# Patient Record
Sex: FEMALE | Race: WHITE | NOT HISPANIC OR LATINO | Employment: OTHER | URBAN - NONMETROPOLITAN AREA
[De-identification: names, ages, dates, MRNs, and addresses within clinical notes are randomized per-mention and may not be internally consistent; named-entity substitution may affect disease eponyms.]

---

## 2017-01-30 ENCOUNTER — HOSPITAL ENCOUNTER (EMERGENCY)
Facility: HOSPITAL | Age: 32
Discharge: HOME | End: 2017-01-30
Attending: EMERGENCY MEDICINE | Admitting: EMERGENCY MEDICINE

## 2017-01-30 ENCOUNTER — APPOINTMENT (OUTPATIENT)
Dept: GENERAL RADIOLOGY | Facility: HOSPITAL | Age: 32
End: 2017-01-30

## 2017-01-30 VITALS
RESPIRATION RATE: 20 BRPM | OXYGEN SATURATION: 97 % | BODY MASS INDEX: 22.08 KG/M2 | WEIGHT: 120 LBS | SYSTOLIC BLOOD PRESSURE: 110 MMHG | DIASTOLIC BLOOD PRESSURE: 70 MMHG | HEIGHT: 62 IN | HEART RATE: 78 BPM | TEMPERATURE: 98.4 F

## 2017-01-30 DIAGNOSIS — R07.89 CHEST WALL PAIN: Primary | ICD-10-CM

## 2017-01-30 PROCEDURE — 99283 EMERGENCY DEPT VISIT LOW MDM: CPT

## 2017-01-30 PROCEDURE — 71020 HC CHEST PA AND LATERAL: CPT

## 2017-01-30 PROCEDURE — 93005 ELECTROCARDIOGRAM TRACING: CPT | Performed by: PHYSICIAN ASSISTANT

## 2017-01-30 RX ORDER — NAPROXEN 500 MG/1
500 TABLET ORAL ONCE
Status: COMPLETED | OUTPATIENT
Start: 2017-01-30 | End: 2017-01-30

## 2017-01-30 RX ORDER — NAPROXEN 500 MG/1
500 TABLET ORAL 2 TIMES DAILY PRN
Qty: 20 TABLET | Refills: 0 | Status: SHIPPED | OUTPATIENT
Start: 2017-01-30 | End: 2017-06-21

## 2017-01-30 RX ADMIN — NAPROXEN 500 MG: 500 TABLET ORAL at 16:13

## 2017-06-08 VITALS
SYSTOLIC BLOOD PRESSURE: 96 MMHG | DIASTOLIC BLOOD PRESSURE: 60 MMHG | BODY MASS INDEX: 20.8 KG/M2 | HEIGHT: 62 IN | WEIGHT: 113 LBS

## 2017-06-21 ENCOUNTER — OFFICE VISIT (OUTPATIENT)
Dept: OBSTETRICS AND GYNECOLOGY | Facility: CLINIC | Age: 32
End: 2017-06-21

## 2017-06-21 VITALS
SYSTOLIC BLOOD PRESSURE: 102 MMHG | HEIGHT: 62 IN | DIASTOLIC BLOOD PRESSURE: 60 MMHG | BODY MASS INDEX: 21.71 KG/M2 | WEIGHT: 118 LBS

## 2017-06-21 DIAGNOSIS — N94.10 DYSPAREUNIA, FEMALE: ICD-10-CM

## 2017-06-21 DIAGNOSIS — R10.2 PELVIC PAIN: Primary | ICD-10-CM

## 2017-06-21 PROCEDURE — 99214 OFFICE O/P EST MOD 30 MIN: CPT | Performed by: OBSTETRICS & GYNECOLOGY

## 2017-06-21 RX ORDER — GABAPENTIN 300 MG/1
300 CAPSULE ORAL 3 TIMES DAILY
COMMUNITY
End: 2023-03-02

## 2017-06-21 NOTE — PROGRESS NOTES
"Subjective   Chief Complaint   Patient presents with   • Pelvic Pain     pt has been c/o of pelvic pain x1 year, Hx of Ovarian Cyst     Lacey Hubbard is a 31 y.o. year old .  Patient's last menstrual period was 2017.  She presents to be seen because of LLq pains, severe dysparnueai. Started about one year.  S/P RSO and BTL 2 years ago for similar complaints on the right side. Her mense sare irergular, fluctuating.   Op report reveiwed from 2 years ago no endo or adhesions noted  States U/S today caused her tremendous pain  Regular bowel habits  Last 2/15  Gabapetnin--may be 2 x month  Good health otherwise    OTHER COMPLAINTS:  Nothing else    The following portions of the patient's history were reviewed and updated as appropriate:current medications, allergies, past family history, past medical history, past social history and past surgical history    Smoking status: Current Every Day Smoker                                                   Packs/day: 2.00      Years: 15.00     Smokeless status: Never Used                        Review of Systems  Consitutional POS: nothing reported    NEG: anorexia or night sweats   Gastointestinal POS: abdominal pain    NEG: bloating, change in bowel habits, constipation, diarrhea, melena, nausea, reflux symptoms or vomiting   Genitourinary POS: nothing reported    NEG: dysuria, hematuria, nocturia or urgency   Integument POS: nothing reported    NEG: moles that are changing in size, shape, color or rashes   Breast POS: nothing reported    NEG: persistent breast lump, skin dimpling or nipple discharge         Pertinent items are noted in HPI.          Objective   /60  Ht 62\" (157.5 cm)  Wt 118 lb (53.5 kg)  LMP 2017  BMI 21.58 kg/m2    General:  well developed; well nourished  no acute distress   Skin:  No suspicious lesions seen   Thyroid: normal to inspection and palpation   Lungs:  breathing is unlabored  clear to auscultation bilaterally "   Heart:  regular rate and rhythm, S1, S2 normal, no murmur, click, rub or gallop   Breasts:  Not performed.   Abdomen: soft, non-tender; no masses  no umbilical or inginual hernias are present  no hepato-splenomegaly  Abnormal findings: moderate tenderness in the LLQ   Pelvis: Clinical staff was present for exam  External genitalia:  normal appearance of the external genitalia including Bartholin's and [place]'s glands.  :  urethral meatus normal; urethral hypermobility is absent.  Vaginal:  normal pink mucosa without prolapse or lesions.  Cervix:  normal appearance.  Uterus:  normal size, shape and consistency.  Adnexa:  normal bimanual exam of the adnexa.     Lab Review   No data reviewed    Imaging   Pelvic ultrasound images independantly reviewed - TVs WNL, 1.4 collpasing cyst        Assessment   1. CPP  2. Dysparenuia   Discussed optoins at length with patient as she is moving will start on Low dose OCP     Plan   1. Sample of Loloestrin given, patient moving to [place] in 2 weeks  2. Follow up PRN          This note was electronically signed.      June 21, 2017

## 2017-06-21 NOTE — PATIENT INSTRUCTIONS
Pelvic Pain, Female  Female pelvic pain can be caused by many different things and start from a variety of places. Pelvic pain refers to pain that is located in the lower half of the abdomen and between your hips. The pain may occur over a short period of time (acute) or may be reoccurring (chronic). The cause of pelvic pain may be related to disorders affecting the female reproductive organs (gynecologic), but it may also be related to the bladder, kidney stones, an intestinal complication, or muscle or skeletal problems. Getting help right away for pelvic pain is important, especially if there has been severe, sharp, or a sudden onset of unusual pain. It is also important to get help right away because some types of pelvic pain can be life threatening.   CAUSES   Below are only some of the causes of pelvic pain. The causes of pelvic pain can be in one of several categories.   · Gynecologic.    Pelvic inflammatory disease.    Sexually transmitted infection.    Ovarian cyst or a twisted ovarian ligament (ovarian torsion).    Uterine lining that grows outside the uterus (endometriosis).    Fibroids, cysts, or tumors.    Ovulation.  · Pregnancy.    Pregnancy that occurs outside the uterus (ectopic pregnancy).    Miscarriage.    Labor.    Abruption of the placenta or ruptured uterus.  · Infection.    Uterine infection (endometritis).    Bladder infection.    Diverticulitis.    Miscarriage related to a uterine infection (septic ).  · Bladder.    Inflammation of the bladder (cystitis).    Kidney stone(s).  · Gastrointestinal.    Constipation.    Diverticulitis.  · Neurologic.    Trauma.    Feeling pelvic pain because of mental or emotional causes (psychosomatic).  · Cancers of the bowel or pelvis.  EVALUATION   Your caregiver will want to take a careful history of your concerns. This includes recent changes in your health, a careful gynecologic history of your periods (menses), and a sexual history. Obtaining  your family history and medical history is also important. Your caregiver may suggest a pelvic exam. A pelvic exam will help identify the location and severity of the pain. It also helps in the evaluation of which organ system may be involved. In order to identify the cause of the pelvic pain and be properly treated, your caregiver may order tests. These tests may include:   · A pregnancy test.  · Pelvic ultrasonography.  · An X-ray exam of the abdomen.  · A urinalysis or evaluation of vaginal discharge.  · Blood tests.  HOME CARE INSTRUCTIONS   · Only take over-the-counter or prescription medicines for pain, discomfort, or fever as directed by your caregiver.    · Rest as directed by your caregiver.    · Eat a balanced diet.    · Drink enough fluids to make your urine clear or pale yellow, or as directed.    · Avoid sexual intercourse if it causes pain.    · Apply warm or cold compresses to the lower abdomen depending on which one helps the pain.    · Avoid stressful situations.    · Keep a journal of your pelvic pain. Write down when it started, where the pain is located, and if there are things that seem to be associated with the pain, such as food or your menstrual cycle.  · Follow up with your caregiver as directed.    SEEK MEDICAL CARE IF:  · Your medicine does not help your pain.  · You have abnormal vaginal discharge.  SEEK IMMEDIATE MEDICAL CARE IF:   · You have heavy bleeding from the vagina.    · Your pelvic pain increases.    · You feel light-headed or faint.    · You have chills.    · You have pain with urination or blood in your urine.    · You have uncontrolled diarrhea or vomiting.    · You have a fever or persistent symptoms for more than 3 days.  · You have a fever and your symptoms suddenly get worse.    · You are being physically or sexually abused.     This information is not intended to replace advice given to you by your health care provider. Make sure you discuss any questions you have with  your health care provider.     Document Released: 11/14/2005 Document Revised: 09/07/2016 Document Reviewed: 10/07/2016  Elsevier Interactive Patient Education ©2017 [employer].

## 2017-08-03 ENCOUNTER — OFFICE VISIT (OUTPATIENT)
Dept: OBSTETRICS AND GYNECOLOGY | Facility: CLINIC | Age: 32
End: 2017-08-03

## 2017-08-03 VITALS
DIASTOLIC BLOOD PRESSURE: 66 MMHG | WEIGHT: 123 LBS | BODY MASS INDEX: 22.63 KG/M2 | HEIGHT: 62 IN | SYSTOLIC BLOOD PRESSURE: 108 MMHG

## 2017-08-03 DIAGNOSIS — N92.1 MENORRHAGIA WITH IRREGULAR CYCLE: ICD-10-CM

## 2017-08-03 DIAGNOSIS — R10.2 PELVIC PAIN: Primary | ICD-10-CM

## 2017-08-03 PROCEDURE — 99213 OFFICE O/P EST LOW 20 MIN: CPT | Performed by: OBSTETRICS & GYNECOLOGY

## 2017-08-03 RX ORDER — PHENAZOPYRIDINE HYDROCHLORIDE 100 MG/1
200 TABLET, FILM COATED ORAL ONCE
Status: CANCELLED | OUTPATIENT
Start: 2017-08-03 | End: 2017-08-03

## 2017-08-03 RX ORDER — SODIUM CHLORIDE 0.9 % (FLUSH) 0.9 %
1-10 SYRINGE (ML) INJECTION AS NEEDED
Status: CANCELLED | OUTPATIENT
Start: 2017-08-03

## 2017-08-03 NOTE — PATIENT INSTRUCTIONS
Dysfunctional Uterine Bleeding  Dysfunctional uterine bleeding is abnormal bleeding from the uterus. Dysfunctional uterine bleeding includes:  · A period that comes earlier or later than usual.  · A period that is lighter, heavier, or has blood clots.  · Bleeding between periods.  · Skipping one or more periods.  · Bleeding after sexual intercourse.  · Bleeding after menopause.  HOME CARE INSTRUCTIONS   Pay attention to any changes in your symptoms. Follow these instructions to help with your condition:  Eating  · Eat well-balanced meals. Include foods that are high in iron, such as liver, meat, shellfish, green leafy vegetables, and eggs.  · If you become constipated:    Drink plenty of water.    Eat fruits and vegetables that are high in water and fiber, such as spinach, carrots, raspberries, apples, and jade.  Medicines  · Take over-the-counter and prescription medicines only as told by your health care provider.  · Do not change medicines without talking with your health care provider.  · Aspirin or medicines that contain aspirin may make the bleeding worse. Do not take those medicines:    During the week before your period.    During your period.  · If you were prescribed iron pills, take them as told by your health care provider. Iron pills help to replace iron that your body loses because of this condition.  Activity  · If you need to change your sanitary pad or tampon more than one time every 2 hours:    Lie in bed with your feet raised (elevated).    Place a cold pack on your lower abdomen.    Rest as much as possible until the bleeding stops or slows down.  · Do not try to lose weight until the bleeding has stopped and your blood iron level is back to normal.  Other Instructions  · For two months, write down:    When your period starts.    When your period ends.    When any abnormal bleeding occurs.    What problems you notice.  · Keep all follow up visits as told by your health care provider. This is  important.  SEEK MEDICAL CARE IF:  · You get light-headed or weak.  · You have nausea and vomiting.  · You cannot eat or drink without vomiting.  · You feel dizzy or have diarrhea while you are taking medicines.  · You are taking birth control pills or hormones, and you want to change them or stop taking them.  SEEK IMMEDIATE MEDICAL CARE IF:  · You develop a fever or chills.  · You need to change your sanitary pad or tampon more than one time per hour.  · Your bleeding becomes heavier, or your flow contains clots more often.  · You develop pain in your abdomen.  · You lose consciousness.  · You develop a rash.     This information is not intended to replace advice given to you by your health care provider. Make sure you discuss any questions you have with your health care provider.     Document Released: 12/15/2001 Document Revised: 09/07/2016 Document Reviewed: 03/14/2016  "[employer] Interactive Patient Education ©2017 Else[employer] Inc.    Pelvic Pain, Female  Female pelvic pain can be caused by many different things and start from a variety of places. Pelvic pain refers to pain that is located in the lower half of the abdomen and between your hips. The pain may occur over a short period of time (acute) or may be reoccurring (chronic). The cause of pelvic pain may be related to disorders affecting the female reproductive organs (gynecologic), but it may also be related to the bladder, kidney stones, an intestinal complication, or muscle or skeletal problems. Getting help right away for pelvic pain is important, especially if there has been severe, sharp, or a sudden onset of unusual pain. It is also important to get help right away because some types of pelvic pain can be life threatening.   CAUSES   Below are only some of the causes of pelvic pain. The causes of pelvic pain can be in one of several categories.   · Gynecologic.    Pelvic inflammatory disease.    Sexually transmitted infection.    Ovarian cyst or a  twisted ovarian ligament (ovarian torsion).    Uterine lining that grows outside the uterus (endometriosis).    Fibroids, cysts, or tumors.    Ovulation.  · Pregnancy.    Pregnancy that occurs outside the uterus (ectopic pregnancy).    Miscarriage.    Labor.    Abruption of the placenta or ruptured uterus.  · Infection.    Uterine infection (endometritis).    Bladder infection.    Diverticulitis.    Miscarriage related to a uterine infection (septic ).  · Bladder.    Inflammation of the bladder (cystitis).    Kidney stone(s).  · Gastrointestinal.    Constipation.    Diverticulitis.  · Neurologic.    Trauma.    Feeling pelvic pain because of mental or emotional causes (psychosomatic).  · Cancers of the bowel or pelvis.  EVALUATION   Your caregiver will want to take a careful history of your concerns. This includes recent changes in your health, a careful gynecologic history of your periods (menses), and a sexual history. Obtaining your family history and medical history is also important. Your caregiver may suggest a pelvic exam. A pelvic exam will help identify the location and severity of the pain. It also helps in the evaluation of which organ system may be involved. In order to identify the cause of the pelvic pain and be properly treated, your caregiver may order tests. These tests may include:   · A pregnancy test.  · Pelvic ultrasonography.  · An X-ray exam of the abdomen.  · A urinalysis or evaluation of vaginal discharge.  · Blood tests.  HOME CARE INSTRUCTIONS   · Only take over-the-counter or prescription medicines for pain, discomfort, or fever as directed by your caregiver.    · Rest as directed by your caregiver.    · Eat a balanced diet.    · Drink enough fluids to make your urine clear or pale yellow, or as directed.    · Avoid sexual intercourse if it causes pain.    · Apply warm or cold compresses to the lower abdomen depending on which one helps the pain.    · Avoid stressful situations.     · Keep a journal of your pelvic pain. Write down when it started, where the pain is located, and if there are things that seem to be associated with the pain, such as food or your menstrual cycle.  · Follow up with your caregiver as directed.    SEEK MEDICAL CARE IF:  · Your medicine does not help your pain.  · You have abnormal vaginal discharge.  SEEK IMMEDIATE MEDICAL CARE IF:   · You have heavy bleeding from the vagina.    · Your pelvic pain increases.    · You feel light-headed or faint.    · You have chills.    · You have pain with urination or blood in your urine.    · You have uncontrolled diarrhea or vomiting.    · You have a fever or persistent symptoms for more than 3 days.  · You have a fever and your symptoms suddenly get worse.    · You are being physically or sexually abused.     This information is not intended to replace advice given to you by your health care provider. Make sure you discuss any questions you have with your health care provider.     Document Released: 11/14/2005 Document Revised: 09/07/2016 Document Reviewed: 10/07/2016  [employer] Interactive Patient Education ©2017 [employer]

## 2017-08-03 NOTE — PROGRESS NOTES
Subjective   Chief Complaint   Patient presents with   • Consult     wants to discuss having Hysterectomy     Lacey Hubbard is a 31 y.o. year old .  Patient's last menstrual period was 2017.  She presents to be seen because of persistent CPP, and AUB   LLq pains, severe dysparnueai. Started about one year.  S/P RSO and BTL 2 years ago for similar complaints on the right side. Her mense sare irergular, fluctuating.   Op report reveiwed from 2 years ago no endo or adhesions noted. Started on OCP but made bleeding worse. Desires definitive management.  States U/S today caused her tremendous pain  Regular bowel habits  Last 2/15  Gabapetnin--may be 2 x month  Good health otherwise.     OTHER COMPLAINTS:  Nothing else    The following portions of the patient's history were reviewed and updated as appropriate:  She  has a past medical history of Asthma and Ovarian cyst.  She  does not have a problem list on file.  She  has a past surgical history that includes Oophorectomy (Right); diagnostic laparoscopy, salpingo oophorectomy laparoscopic (Right, 2015); [place] tooth extraction; and Other surgical history.  Her family history includes Breast cancer in her sister.  She  reports that she has been smoking.  She has a 30.00 pack-year smoking history. She has never used smokeless tobacco. She reports that she does not drink alcohol or use illicit drugs.  Current Outpatient Prescriptions   Medication Sig Dispense Refill   • gabapentin (NEURONTIN) 300 MG capsule Take 300 mg by mouth 3 (Three) Times a Day.       No current facility-administered medications for this visit.      Current Outpatient Prescriptions on File Prior to Visit   Medication Sig   • gabapentin (NEURONTIN) 300 MG capsule Take 300 mg by mouth 3 (Three) Times a Day.     No current facility-administered medications on file prior to visit.      She is allergic to penicillins.    Smoking status: Current Every Day Smoker                    "                                Packs/day: 2.00      Years: 15.00     Smokeless status: Never Used                        Review of Systems  Consitutional POS: nothing reported    NEG: anorexia or night sweats   Gastointestinal POS: nothing reported    NEG: bloating, change in bowel habits, melena or reflux symptoms   Genitourinary POS: nothing reported    NEG: dysuria or hematuria   Integument POS: nothing reported    NEG: moles that are changing in size, shape, color or rashes   Breast POS: nothing reported    NEG: persistent breast lump, skin dimpling or nipple discharge         Cardiovascular: negative  Gastrointestinal: negative  GYN:  positive for  as above  Behavioral/Psych: negative          Objective   /66  Ht 62\" (157.5 cm)  Wt 123 lb (55.8 kg)  LMP 08/02/2017  BMI 22.5 kg/m2    General:  well developed; well nourished  no acute distress  appears stated age   Skin:  No suspicious lesions seen   Thyroid: normal to inspection and palpation   Lungs:  breathing is unlabored  clear to auscultation bilaterally   Heart:  regular rate and rhythm, S1, S2 normal, no murmur, click, rub or gallop   Breasts:  Not performed.   Abdomen: soft, non-tender; no masses  no umbilical or inginual hernias are present  no hepato-splenomegaly   Pelvis: Clinical staff was present for exam  External genitalia:  normal appearance of the external genitalia including Bartholin's and [place]'s glands.  :  urethral meatus normal; urethral hypermobility is absent.  Vaginal:  normal pink mucosa without prolapse or lesions.  Cervix:  normal appearance.  Uterus:  normal size, shape and consistency.  Adnexa:  normal bimanual exam of the adnexa.     Lab Review   No data reviewed    Imaging   Pelvic ultrasound images independantly reviewed - WNL        Assessment   1. CPP, AUB: failed medical mgmt     Plan   1. TLH/USO/Cystoscopy  2. Follow up PRN     No orders of the defined types were placed in this encounter.         This note was " electronically signed.      August 3, 2017

## 2017-08-08 ENCOUNTER — RESULTS ENCOUNTER (OUTPATIENT)
Dept: OBSTETRICS AND GYNECOLOGY | Facility: CLINIC | Age: 32
End: 2017-08-08

## 2017-08-08 DIAGNOSIS — R10.2 PELVIC PAIN: ICD-10-CM

## 2017-08-08 DIAGNOSIS — N92.1 MENORRHAGIA WITH IRREGULAR CYCLE: ICD-10-CM

## 2017-09-11 ENCOUNTER — APPOINTMENT (OUTPATIENT)
Dept: PREADMISSION TESTING | Facility: HOSPITAL | Age: 32
End: 2017-09-11

## 2017-09-11 VITALS — BODY MASS INDEX: 21.61 KG/M2 | HEIGHT: 62 IN | WEIGHT: 117.44 LBS

## 2017-09-11 DIAGNOSIS — R10.2 PELVIC PAIN: ICD-10-CM

## 2017-09-11 DIAGNOSIS — N92.1 MENORRHAGIA WITH IRREGULAR CYCLE: ICD-10-CM

## 2017-09-11 LAB
ABO GROUP BLD: NORMAL
ALBUMIN SERPL-MCNC: 4.6 G/DL (ref 3.5–5)
ALBUMIN/GLOB SERPL: 1.6 G/DL (ref 1–2)
ALP SERPL-CCNC: 72 U/L (ref 38–126)
ALT SERPL W P-5'-P-CCNC: 56 U/L (ref 13–69)
ANION GAP SERPL CALCULATED.3IONS-SCNC: 11.8 MMOL/L
APTT PPP: 26.6 SECONDS (ref 25–36)
AST SERPL-CCNC: 27 U/L (ref 15–46)
BASOPHILS # BLD AUTO: 0.03 10*3/MM3 (ref 0–0.2)
BASOPHILS NFR BLD AUTO: 0.5 % (ref 0–2.5)
BILIRUB SERPL-MCNC: 0.4 MG/DL (ref 0.2–1.3)
BILIRUB UR QL STRIP: NEGATIVE
BUN BLD-MCNC: 8 MG/DL (ref 7–20)
BUN/CREAT SERPL: 13.3 (ref 7.1–23.5)
CALCIUM SPEC-SCNC: 9.4 MG/DL (ref 8.4–10.2)
CHLORIDE SERPL-SCNC: 104 MMOL/L (ref 98–107)
CLARITY UR: ABNORMAL
CO2 SERPL-SCNC: 28 MMOL/L (ref 26–30)
COLOR UR: YELLOW
CREAT BLD-MCNC: 0.6 MG/DL (ref 0.6–1.3)
DEPRECATED RDW RBC AUTO: 42.8 FL (ref 37–54)
EOSINOPHIL # BLD AUTO: 0.07 10*3/MM3 (ref 0–0.7)
EOSINOPHIL NFR BLD AUTO: 1.2 % (ref 0–7)
ERYTHROCYTE [DISTWIDTH] IN BLOOD BY AUTOMATED COUNT: 12.5 % (ref 11.5–14.5)
GFR SERPL CREATININE-BSD FRML MDRD: 114 ML/MIN/1.73
GLOBULIN UR ELPH-MCNC: 2.8 GM/DL
GLUCOSE BLD-MCNC: 88 MG/DL (ref 74–98)
GLUCOSE UR STRIP-MCNC: NEGATIVE MG/DL
HCT VFR BLD AUTO: 42.4 % (ref 37–47)
HGB BLD-MCNC: 14.1 G/DL (ref 12–16)
HGB UR QL STRIP.AUTO: NEGATIVE
IMM GRANULOCYTES # BLD: 0.02 10*3/MM3 (ref 0–0.06)
IMM GRANULOCYTES NFR BLD: 0.3 % (ref 0–0.6)
INR PPP: 1.1 (ref 0.9–1.1)
KETONES UR QL STRIP: NEGATIVE
LEUKOCYTE ESTERASE UR QL STRIP.AUTO: NEGATIVE
LYMPHOCYTES # BLD AUTO: 2.01 10*3/MM3 (ref 0.6–3.4)
LYMPHOCYTES NFR BLD AUTO: 35.1 % (ref 10–50)
MCH RBC QN AUTO: 30.9 PG (ref 27–31)
MCHC RBC AUTO-ENTMCNC: 33.3 G/DL (ref 30–37)
MCV RBC AUTO: 93 FL (ref 81–99)
MONOCYTES # BLD AUTO: 0.39 10*3/MM3 (ref 0–0.9)
MONOCYTES NFR BLD AUTO: 6.8 % (ref 0–12)
NEUTROPHILS # BLD AUTO: 3.21 10*3/MM3 (ref 2–6.9)
NEUTROPHILS NFR BLD AUTO: 56.1 % (ref 37–80)
NITRITE UR QL STRIP: NEGATIVE
NRBC BLD MANUAL-RTO: 0.3 /100 WBC (ref 0–0)
PH UR STRIP.AUTO: 7 [PH] (ref 5–8)
PLATELET # BLD AUTO: 203 10*3/MM3 (ref 130–400)
PMV BLD AUTO: 11.2 FL (ref 6–12)
POTASSIUM BLD-SCNC: 3.8 MMOL/L (ref 3.5–5.1)
PROT SERPL-MCNC: 7.4 G/DL (ref 6.3–8.2)
PROT UR QL STRIP: NEGATIVE
PROTHROMBIN TIME: 12 SECONDS (ref 9.3–12.1)
RBC # BLD AUTO: 4.56 10*6/MM3 (ref 4.2–5.4)
RH BLD: POSITIVE
SODIUM BLD-SCNC: 140 MMOL/L (ref 137–145)
SP GR UR STRIP: 1.01 (ref 1–1.03)
UROBILINOGEN UR QL STRIP: ABNORMAL
WBC NRBC COR # BLD: 5.73 10*3/MM3 (ref 4.8–10.8)

## 2017-09-11 PROCEDURE — 85025 COMPLETE CBC W/AUTO DIFF WBC: CPT | Performed by: OBSTETRICS & GYNECOLOGY

## 2017-09-11 PROCEDURE — 81003 URINALYSIS AUTO W/O SCOPE: CPT | Performed by: OBSTETRICS & GYNECOLOGY

## 2017-09-11 PROCEDURE — 85730 THROMBOPLASTIN TIME PARTIAL: CPT | Performed by: OBSTETRICS & GYNECOLOGY

## 2017-09-11 PROCEDURE — 36415 COLL VENOUS BLD VENIPUNCTURE: CPT | Performed by: OBSTETRICS & GYNECOLOGY

## 2017-09-11 PROCEDURE — 80053 COMPREHEN METABOLIC PANEL: CPT | Performed by: OBSTETRICS & GYNECOLOGY

## 2017-09-11 PROCEDURE — 85610 PROTHROMBIN TIME: CPT | Performed by: OBSTETRICS & GYNECOLOGY

## 2017-09-11 PROCEDURE — 86900 BLOOD TYPING SEROLOGIC ABO: CPT | Performed by: OBSTETRICS & GYNECOLOGY

## 2017-09-11 PROCEDURE — 86901 BLOOD TYPING SEROLOGIC RH(D): CPT | Performed by: OBSTETRICS & GYNECOLOGY

## 2017-09-11 RX ORDER — ACETAMINOPHEN 325 MG/1
650 TABLET ORAL EVERY 4 HOURS PRN
COMMUNITY
End: 2023-03-02

## 2017-09-18 ENCOUNTER — TELEPHONE (OUTPATIENT)
Dept: OBSTETRICS AND GYNECOLOGY | Facility: CLINIC | Age: 32
End: 2017-09-18

## 2017-09-18 NOTE — TELEPHONE ENCOUNTER
I do not have a problem doing an ablation .  However, I cannot guarantee any sort of significant improvement in her pain.  The pain may improve or may not change.  ablation, will help with her bleeding.

## 2017-09-18 NOTE — TELEPHONE ENCOUNTER
Dr Briones,  Patient called and advised is scheduled for surgery on Friday for complete Hysterectomy. She advised she has been looking online and wanted to see what you thought about doing endometrial ablation vs doing hysterectomy? Thanks

## 2017-09-18 NOTE — TELEPHONE ENCOUNTER
Called and spoke with patient and she does want to only do endometrial ablation at this time. Explained to patient risk of no improvement in pain and she agreed she understood and would like to try ablation first.

## 2019-04-04 ENCOUNTER — APPOINTMENT (OUTPATIENT)
Dept: CT IMAGING | Facility: HOSPITAL | Age: 34
End: 2019-04-04

## 2019-04-04 ENCOUNTER — HOSPITAL ENCOUNTER (EMERGENCY)
Facility: HOSPITAL | Age: 34
Discharge: HOME | End: 2019-04-04
Attending: EMERGENCY MEDICINE | Admitting: EMERGENCY MEDICINE

## 2019-04-04 VITALS
HEART RATE: 73 BPM | OXYGEN SATURATION: 94 % | RESPIRATION RATE: 17 BRPM | HEIGHT: 62 IN | DIASTOLIC BLOOD PRESSURE: 84 MMHG | SYSTOLIC BLOOD PRESSURE: 116 MMHG | WEIGHT: 112 LBS | TEMPERATURE: 97.6 F | BODY MASS INDEX: 20.61 KG/M2

## 2019-04-04 DIAGNOSIS — R10.9 ABDOMINAL PAIN, UNSPECIFIED ABDOMINAL LOCATION: ICD-10-CM

## 2019-04-04 DIAGNOSIS — R10.9 RIGHT FLANK PAIN: Primary | ICD-10-CM

## 2019-04-04 LAB
ALBUMIN SERPL-MCNC: 4 G/DL (ref 3.5–5)
ALBUMIN/GLOB SERPL: 1.5 G/DL (ref 1–2)
ALP SERPL-CCNC: 68 U/L (ref 38–126)
ALT SERPL W P-5'-P-CCNC: 23 U/L (ref 13–69)
ANION GAP SERPL CALCULATED.3IONS-SCNC: 9.8 MMOL/L (ref 10–20)
AST SERPL-CCNC: 16 U/L (ref 15–46)
B-HCG UR QL: NEGATIVE
BACTERIA UR QL AUTO: ABNORMAL /HPF
BASOPHILS # BLD AUTO: 0.04 10*3/MM3 (ref 0–0.2)
BASOPHILS NFR BLD AUTO: 0.6 % (ref 0–1.5)
BILIRUB SERPL-MCNC: 0.2 MG/DL (ref 0.2–1.3)
BILIRUB UR QL STRIP: NEGATIVE
BUN BLD-MCNC: 12 MG/DL (ref 7–20)
BUN/CREAT SERPL: 17.1 (ref 7.1–23.5)
CALCIUM SPEC-SCNC: 9.4 MG/DL (ref 8.4–10.2)
CHLORIDE SERPL-SCNC: 104 MMOL/L (ref 98–107)
CLARITY UR: ABNORMAL
CO2 SERPL-SCNC: 29 MMOL/L (ref 26–30)
COLOR UR: YELLOW
CREAT BLD-MCNC: 0.7 MG/DL (ref 0.6–1.3)
DEPRECATED RDW RBC AUTO: 46.4 FL (ref 37–54)
EOSINOPHIL # BLD AUTO: 0.06 10*3/MM3 (ref 0–0.4)
EOSINOPHIL NFR BLD AUTO: 1 % (ref 0.3–6.2)
ERYTHROCYTE [DISTWIDTH] IN BLOOD BY AUTOMATED COUNT: 13.2 % (ref 12.3–15.4)
GFR SERPL CREATININE-BSD FRML MDRD: 95 ML/MIN/1.73
GLOBULIN UR ELPH-MCNC: 2.6 GM/DL
GLUCOSE BLD-MCNC: 90 MG/DL (ref 74–98)
GLUCOSE UR STRIP-MCNC: NEGATIVE MG/DL
HCT VFR BLD AUTO: 41 % (ref 34–46.6)
HGB BLD-MCNC: 13.9 G/DL (ref 12–15.9)
HGB UR QL STRIP.AUTO: NEGATIVE
HYALINE CASTS UR QL AUTO: ABNORMAL /LPF
IMM GRANULOCYTES # BLD AUTO: 0.02 10*3/MM3 (ref 0–0.05)
IMM GRANULOCYTES NFR BLD AUTO: 0.3 % (ref 0–0.5)
KETONES UR QL STRIP: NEGATIVE
LEUKOCYTE ESTERASE UR QL STRIP.AUTO: ABNORMAL
LIPASE SERPL-CCNC: 97 U/L (ref 23–300)
LYMPHOCYTES # BLD AUTO: 2.17 10*3/MM3 (ref 0.7–3.1)
LYMPHOCYTES NFR BLD AUTO: 34.4 % (ref 19.6–45.3)
MCH RBC QN AUTO: 32 PG (ref 26.6–33)
MCHC RBC AUTO-ENTMCNC: 33.9 G/DL (ref 31.5–35.7)
MCV RBC AUTO: 94.5 FL (ref 79–97)
MONOCYTES # BLD AUTO: 0.41 10*3/MM3 (ref 0.1–0.9)
MONOCYTES NFR BLD AUTO: 6.5 % (ref 5–12)
NEUTROPHILS # BLD AUTO: 3.6 10*3/MM3 (ref 1.4–7)
NEUTROPHILS NFR BLD AUTO: 57.2 % (ref 42.7–76)
NITRITE UR QL STRIP: NEGATIVE
NRBC BLD AUTO-RTO: 0 /100 WBC (ref 0–0)
PH UR STRIP.AUTO: 7 [PH] (ref 5–8)
PLATELET # BLD AUTO: 160 10*3/MM3 (ref 140–450)
PMV BLD AUTO: 11.3 FL (ref 6–12)
POTASSIUM BLD-SCNC: 3.8 MMOL/L (ref 3.5–5.1)
PROT SERPL-MCNC: 6.6 G/DL (ref 6.3–8.2)
PROT UR QL STRIP: NEGATIVE
RBC # BLD AUTO: 4.34 10*6/MM3 (ref 3.77–5.28)
RBC # UR: ABNORMAL /HPF
REF LAB TEST METHOD: ABNORMAL
SODIUM BLD-SCNC: 139 MMOL/L (ref 137–145)
SP GR UR STRIP: 1.01 (ref 1–1.03)
SQUAMOUS #/AREA URNS HPF: ABNORMAL /HPF
UROBILINOGEN UR QL STRIP: ABNORMAL
WBC NRBC COR # BLD: 6.3 10*3/MM3 (ref 3.4–10.8)
WBC UR QL AUTO: ABNORMAL /HPF

## 2019-04-04 PROCEDURE — 96374 THER/PROPH/DIAG INJ IV PUSH: CPT

## 2019-04-04 PROCEDURE — 25010000002 KETOROLAC TROMETHAMINE PER 15 MG: Performed by: EMERGENCY MEDICINE

## 2019-04-04 PROCEDURE — 99283 EMERGENCY DEPT VISIT LOW MDM: CPT

## 2019-04-04 PROCEDURE — 74176 CT ABD & PELVIS W/O CONTRAST: CPT

## 2019-04-04 PROCEDURE — 80053 COMPREHEN METABOLIC PANEL: CPT | Performed by: EMERGENCY MEDICINE

## 2019-04-04 PROCEDURE — 83690 ASSAY OF LIPASE: CPT | Performed by: EMERGENCY MEDICINE

## 2019-04-04 PROCEDURE — 81001 URINALYSIS AUTO W/SCOPE: CPT | Performed by: EMERGENCY MEDICINE

## 2019-04-04 PROCEDURE — 81025 URINE PREGNANCY TEST: CPT | Performed by: EMERGENCY MEDICINE

## 2019-04-04 PROCEDURE — 85025 COMPLETE CBC W/AUTO DIFF WBC: CPT | Performed by: EMERGENCY MEDICINE

## 2019-04-04 RX ORDER — SODIUM CHLORIDE 0.9 % (FLUSH) 0.9 %
10 SYRINGE (ML) INJECTION AS NEEDED
Status: DISCONTINUED | OUTPATIENT
Start: 2019-04-04 | End: 2019-04-04 | Stop reason: HOSPADM

## 2019-04-04 RX ORDER — KETOROLAC TROMETHAMINE 30 MG/ML
15 INJECTION, SOLUTION INTRAMUSCULAR; INTRAVENOUS ONCE
Status: COMPLETED | OUTPATIENT
Start: 2019-04-04 | End: 2019-04-04

## 2019-04-04 RX ADMIN — KETOROLAC TROMETHAMINE 15 MG: 30 INJECTION, SOLUTION INTRAMUSCULAR; INTRAVENOUS at 14:03

## 2019-04-04 NOTE — ED PROVIDER NOTES
Subjective   33-year-old female presents to the ED with a chief complaint of flank pain.  The patient is complaining of right-sided flank pain that radiates into her right upper and right lower quadrants.  The pain is been present for months.  It is slowly worsening over the last few days.  She complains of nausea with no vomiting or diarrhea.  No constipation.  No chest pain or shortness of breath.  No fever or chills.  No dysuria or hematuria.  No prior treatments or alleviating factors.  No other complaints at this time.            Review of Systems   Constitutional: Negative for fatigue and fever.   Gastrointestinal: Positive for abdominal distention and nausea. Negative for diarrhea and vomiting.   Genitourinary: Positive for flank pain. Negative for dysuria, hematuria and urgency.   All other systems reviewed and are negative.      Past Medical History:   Diagnosis Date   • Abdominal pain    • Asthma     AS A KID   • Cyst of ovary     REMOVED   • Lower back pain    • Ovarian cyst        Allergies   Allergen Reactions   • Penicillins Anaphylaxis   • Naproxen Itching     RASH/ITCHING SEVERE       Past Surgical History:   Procedure Laterality Date   • DIAGNOSTIC LAPAROSCOPY, SALPINGO OOPHORECTOMY LAPAROSCOPIC Right 09/01/2015   • OTHER SURGICAL HISTORY Left     Broken Hand   • TUBAL ABDOMINAL LIGATION     • WISDOM TOOTH EXTRACTION         Family History   Problem Relation Age of Onset   • Breast cancer Sister        Social History     Socioeconomic History   • Marital status:      Spouse name: Not on file   • Number of children: Not on file   • Years of education: Not on file   • Highest education level: Not on file   Tobacco Use   • Smoking status: Current Every Day Smoker     Packs/day: 1.00     Years: 15.00     Pack years: 15.00   • Smokeless tobacco: Never Used   Substance and Sexual Activity   • Alcohol use: Yes     Comment: SPECIAL OCCASIONS   • Drug use: No   • Sexual activity: Defer            Objective   Physical Exam   Constitutional: She is oriented to person, place, and time. She appears well-developed and well-nourished. No distress.   HENT:   Head: Normocephalic and atraumatic.   Nose: Nose normal.   Eyes: Conjunctivae and EOM are normal.   Cardiovascular: Normal rate and regular rhythm.   Pulmonary/Chest: Effort normal and breath sounds normal. No respiratory distress.   Abdominal: Soft. She exhibits no distension. There is no tenderness. There is no guarding.   Musculoskeletal: She exhibits no edema or deformity.   Neurological: She is alert and oriented to person, place, and time. No cranial nerve deficit.   Skin: She is not diaphoretic.   Nursing note and vitals reviewed.      Procedures           ED Course          Workup Unremarkable.  Laboratory results within normal limits.  CT scan negative for acute process.  Hemodynamically stable.  Patient will be discharged with appropriate follow-up and precautions given.        MDM      Final diagnoses:   Right flank pain   Abdominal pain, unspecified abdominal location            Juan F Barney, DO  04/04/19 [id number]

## 2019-09-25 ENCOUNTER — HOSPITAL ENCOUNTER (EMERGENCY)
Facility: HOSPITAL | Age: 34
Discharge: HOME | End: 2019-09-25
Attending: EMERGENCY MEDICINE | Admitting: EMERGENCY MEDICINE

## 2019-09-25 ENCOUNTER — APPOINTMENT (OUTPATIENT)
Dept: GENERAL RADIOLOGY | Facility: HOSPITAL | Age: 34
End: 2019-09-25

## 2019-09-25 VITALS
HEART RATE: 63 BPM | SYSTOLIC BLOOD PRESSURE: 105 MMHG | TEMPERATURE: 97.8 F | WEIGHT: 110 LBS | DIASTOLIC BLOOD PRESSURE: 72 MMHG | BODY MASS INDEX: 20.24 KG/M2 | RESPIRATION RATE: 20 BRPM | OXYGEN SATURATION: 97 % | HEIGHT: 62 IN

## 2019-09-25 DIAGNOSIS — R07.89 ATYPICAL CHEST PAIN: Primary | ICD-10-CM

## 2019-09-25 LAB
ALBUMIN SERPL-MCNC: 4.6 G/DL (ref 3.5–5.2)
ALBUMIN/GLOB SERPL: 1.8 G/DL
ALP SERPL-CCNC: 73 U/L (ref 39–117)
ALT SERPL W P-5'-P-CCNC: 13 U/L (ref 1–33)
ANION GAP SERPL CALCULATED.3IONS-SCNC: 13.7 MMOL/L (ref 5–15)
AST SERPL-CCNC: 14 U/L (ref 1–32)
BASOPHILS # BLD AUTO: 0.04 10*3/MM3 (ref 0–0.2)
BASOPHILS NFR BLD AUTO: 0.6 % (ref 0–1.5)
BILIRUB SERPL-MCNC: 0.6 MG/DL (ref 0.2–1.2)
BUN BLD-MCNC: 14 MG/DL (ref 6–20)
BUN/CREAT SERPL: 21.5 (ref 7–25)
CALCIUM SPEC-SCNC: 9 MG/DL (ref 8.6–10.5)
CHLORIDE SERPL-SCNC: 101 MMOL/L (ref 98–107)
CO2 SERPL-SCNC: 25.3 MMOL/L (ref 22–29)
CREAT BLD-MCNC: 0.65 MG/DL (ref 0.57–1)
DEPRECATED RDW RBC AUTO: 45.8 FL (ref 37–54)
EOSINOPHIL # BLD AUTO: 0.08 10*3/MM3 (ref 0–0.4)
EOSINOPHIL NFR BLD AUTO: 1.2 % (ref 0.3–6.2)
ERYTHROCYTE [DISTWIDTH] IN BLOOD BY AUTOMATED COUNT: 13.3 % (ref 12.3–15.4)
GFR SERPL CREATININE-BSD FRML MDRD: 103 ML/MIN/1.73
GLOBULIN UR ELPH-MCNC: 2.6 GM/DL
GLUCOSE BLD-MCNC: 91 MG/DL (ref 65–99)
HCT VFR BLD AUTO: 43.3 % (ref 34–46.6)
HGB BLD-MCNC: 14.3 G/DL (ref 12–15.9)
HOLD SPECIMEN: NORMAL
HOLD SPECIMEN: NORMAL
IMM GRANULOCYTES # BLD AUTO: 0.01 10*3/MM3 (ref 0–0.05)
IMM GRANULOCYTES NFR BLD AUTO: 0.2 % (ref 0–0.5)
LYMPHOCYTES # BLD AUTO: 2.25 10*3/MM3 (ref 0.7–3.1)
LYMPHOCYTES NFR BLD AUTO: 34.8 % (ref 19.6–45.3)
MCH RBC QN AUTO: 30.9 PG (ref 26.6–33)
MCHC RBC AUTO-ENTMCNC: 33 G/DL (ref 31.5–35.7)
MCV RBC AUTO: 93.5 FL (ref 79–97)
MONOCYTES # BLD AUTO: 0.46 10*3/MM3 (ref 0.1–0.9)
MONOCYTES NFR BLD AUTO: 7.1 % (ref 5–12)
NEUTROPHILS # BLD AUTO: 3.62 10*3/MM3 (ref 1.7–7)
NEUTROPHILS NFR BLD AUTO: 56.1 % (ref 42.7–76)
NRBC BLD AUTO-RTO: 0 /100 WBC (ref 0–0.2)
PLATELET # BLD AUTO: 212 10*3/MM3 (ref 140–450)
PMV BLD AUTO: 10.8 FL (ref 6–12)
POTASSIUM BLD-SCNC: 3.8 MMOL/L (ref 3.5–5.2)
PROT SERPL-MCNC: 7.2 G/DL (ref 6–8.5)
RBC # BLD AUTO: 4.63 10*6/MM3 (ref 3.77–5.28)
SODIUM BLD-SCNC: 140 MMOL/L (ref 136–145)
TROPONIN I SERPL-MCNC: 0 NG/ML (ref 0–0.05)
TROPONIN T SERPL-MCNC: <0.01 NG/ML (ref 0–0.03)
TROPONIN T SERPL-MCNC: <0.01 NG/ML (ref 0–0.03)
WBC NRBC COR # BLD: 6.46 10*3/MM3 (ref 3.4–10.8)
WHOLE BLOOD HOLD SPECIMEN: NORMAL
WHOLE BLOOD HOLD SPECIMEN: NORMAL

## 2019-09-25 PROCEDURE — 71045 X-RAY EXAM CHEST 1 VIEW: CPT

## 2019-09-25 PROCEDURE — 84484 ASSAY OF TROPONIN QUANT: CPT

## 2019-09-25 PROCEDURE — 99284 EMERGENCY DEPT VISIT MOD MDM: CPT

## 2019-09-25 PROCEDURE — 80053 COMPREHEN METABOLIC PANEL: CPT

## 2019-09-25 PROCEDURE — 84484 ASSAY OF TROPONIN QUANT: CPT | Performed by: EMERGENCY MEDICINE

## 2019-09-25 PROCEDURE — 85025 COMPLETE CBC W/AUTO DIFF WBC: CPT

## 2019-09-25 PROCEDURE — 93005 ELECTROCARDIOGRAM TRACING: CPT

## 2019-09-25 RX ORDER — SODIUM CHLORIDE 0.9 % (FLUSH) 0.9 %
10 SYRINGE (ML) INJECTION AS NEEDED
Status: DISCONTINUED | OUTPATIENT
Start: 2019-09-25 | End: 2019-09-25 | Stop reason: HOSPADM

## 2019-09-25 NOTE — DISCHARGE INSTRUCTIONS
Home to rest.  Call the cardiology office of Dr. Pollard for a follow-up and referred appointment.  Return to the emergency department as needed for worsening symptoms or concerns.  Thank you

## 2019-09-25 NOTE — ED PROVIDER NOTES
"Subjective   Patient presents to the emergency department with complaint of substernal chest pain onset this morning suddenly while performing some exertional work, picking up trash.  Patient states her pain radiated to her jaw and caused her some shortness of breath.  Symptoms resolved after 15 minutes and with rest.  Patient adds that this is been occurring probably 3 times a week since May.  No syncope or calf pain.      Patient has no personal history of coronary artery disease.  There is a very strong family history of premature coronary artery disease.  She is a smoker.        History provided by:  Patient and spouse   used: No    Chest Pain   Pain location:  Substernal area  Pain quality: aching    Pain radiates to:  R jaw  Pain severity:  Mild  Onset quality:  Sudden  Duration:  15 minutes  Progression:  Resolved  Chronicity:  New  Relieved by:  Rest  Worsened by:  Nothing  Ineffective treatments:  None tried  Associated symptoms: palpitations and shortness of breath    Associated symptoms: no abdominal pain, no altered mental status, no back pain, no cough, no diaphoresis, no lower extremity edema, no nausea, no near-syncope, no vomiting and no weakness    Risk factors: smoking    Risk factors: no birth control, no coronary artery disease, no diabetes mellitus, no high cholesterol, no hypertension, not male, not obese, not pregnant and no prior DVT/PE        Review of Systems   Constitutional: Negative for diaphoresis.   Respiratory: Positive for shortness of breath. Negative for cough.    Cardiovascular: Positive for chest pain and palpitations. Negative for near-syncope.        With discussion at the patient's discharge instructions, patient states \"it just hurts a little when I touch my chest\"     Gastrointestinal: Negative for abdominal pain, nausea and vomiting.   Musculoskeletal: Negative for back pain.   Neurological: Negative for weakness.   All other systems reviewed and are " negative.      Past Medical History:   Diagnosis Date   • Abdominal pain    • Asthma     AS A KID   • Cyst of ovary     REMOVED   • Lower back pain    • Ovarian cyst        Allergies   Allergen Reactions   • Penicillins Anaphylaxis   • Naproxen Itching     RASH/ITCHING SEVERE       Past Surgical History:   Procedure Laterality Date   • DIAGNOSTIC LAPAROSCOPY, SALPINGO OOPHORECTOMY LAPAROSCOPIC Right 09/01/2015   • HYSTERECTOMY  05/02/2019    reported by pt    • OTHER SURGICAL HISTORY Left     Broken Hand   • TUBAL ABDOMINAL LIGATION     • WISDOM TOOTH EXTRACTION         Family History   Problem Relation Age of Onset   • Breast cancer Sister        Social History     Socioeconomic History   • Marital status:      Spouse name: Not on file   • Number of children: Not on file   • Years of education: Not on file   • Highest education level: Not on file   Tobacco Use   • Smoking status: Current Every Day Smoker     Packs/day: 1.00     Years: 15.00     Pack years: 15.00   • Smokeless tobacco: Never Used   Substance and Sexual Activity   • Alcohol use: Yes     Comment: SPECIAL OCCASIONS   • Drug use: No   • Sexual activity: Defer           Objective   Physical Exam   Constitutional: She is oriented to person, place, and time. She appears well-developed and well-nourished.  Non-toxic appearance. She does not appear ill. No distress.   HENT:   Head: Normocephalic and atraumatic.   Eyes: Pupils are equal, round, and reactive to light.   Neck: Normal range of motion. Neck supple. No JVD present.   Cardiovascular: Normal rate and regular rhythm. Exam reveals no S3 and no S4.   Pulmonary/Chest: Effort normal and breath sounds normal. She has no wheezes. She has no rales.   Abdominal: Soft. Bowel sounds are normal. She exhibits no distension. There is no rebound and no guarding.   Musculoskeletal: Normal range of motion. She exhibits no edema.        Right lower leg: She exhibits no edema.        Left lower leg: She  exhibits no edema.   Neurological: She is alert and oriented to person, place, and time.   Skin: Skin is warm and dry. Capillary refill takes less than 2 seconds.   Psychiatric: She has a normal mood and affect. Her mood appears not anxious.   Nursing note and vitals reviewed.      Procedures           ED Course  ED Course as of Sep 25 1253   Wed Sep 25, 2019   1104 EKG interpreted by me reveals a sinus arrhythmia at a rate of 80 without ectopy or ischemia.  Normal EKG.  [PF]   1244 Patient has remained chest pain-free throughout her ED stay.  Troponin has been negative x2 sets.  EKG is normal.  Patient has had some reproduction of pain that may be consistent with chest wall origin; nevertheless, she has some significant risk factors including her severe family history of premature coronary artery disease and the fact that she is a smoker.  She agrees to follow-up with cardiology for further cardiac evaluation.  [MS]      ED Course User Index  [MS] Yasemin Sigala APRN  [PF] Sukhi Mondragon W,       Recent Results (from the past 24 hour(s))   Comprehensive Metabolic Panel    Collection Time: 09/25/19 10:11 AM   Result Value Ref Range    Glucose 91 65 - 99 mg/dL    BUN 14 6 - 20 mg/dL    Creatinine 0.65 0.57 - 1.00 mg/dL    Sodium 140 136 - 145 mmol/L    Potassium 3.8 3.5 - 5.2 mmol/L    Chloride 101 98 - 107 mmol/L    CO2 25.3 22.0 - 29.0 mmol/L    Calcium 9.0 8.6 - 10.5 mg/dL    Total Protein 7.2 6.0 - 8.5 g/dL    Albumin 4.60 3.50 - 5.20 g/dL    ALT (SGPT) 13 1 - 33 U/L    AST (SGOT) 14 1 - 32 U/L    Alkaline Phosphatase 73 39 - 117 U/L    Total Bilirubin 0.6 0.2 - 1.2 mg/dL    eGFR Non African Amer 103 >60 mL/min/1.73    Globulin 2.6 gm/dL    A/G Ratio 1.8 g/dL    BUN/Creatinine Ratio 21.5 7.0 - 25.0    Anion Gap 13.7 5.0 - 15.0 mmol/L   Troponin I-Stat    Collection Time: 09/25/19 10:11 AM   Result Value Ref Range    Troponin I 0.000 0.000 - 0.050 ng/mL   Troponin    Collection Time: 09/25/19 10:11 AM    Result Value Ref Range    Troponin T <0.010 0.000 - 0.030 ng/mL   Light Blue Top    Collection Time: 09/25/19 10:11 AM   Result Value Ref Range    Extra Tube hold for add-on    Green Top (Gel)    Collection Time: 09/25/19 10:11 AM   Result Value Ref Range    Extra Tube Hold for add-ons.    Lavender Top    Collection Time: 09/25/19 10:11 AM   Result Value Ref Range    Extra Tube hold for add-on    Gold Top - SST    Collection Time: 09/25/19 10:11 AM   Result Value Ref Range    Extra Tube Hold for add-ons.    CBC Auto Differential    Collection Time: 09/25/19 10:11 AM   Result Value Ref Range    WBC 6.46 3.40 - 10.80 10*3/mm3    RBC 4.63 3.77 - 5.28 10*6/mm3    Hemoglobin 14.3 12.0 - 15.9 g/dL    Hematocrit 43.3 34.0 - 46.6 %    MCV 93.5 79.0 - 97.0 fL    MCH 30.9 26.6 - 33.0 pg    MCHC 33.0 31.5 - 35.7 g/dL    RDW 13.3 12.3 - 15.4 %    RDW-SD 45.8 37.0 - 54.0 fl    MPV 10.8 6.0 - 12.0 fL    Platelets 212 140 - 450 10*3/mm3    Neutrophil % 56.1 42.7 - 76.0 %    Lymphocyte % 34.8 19.6 - 45.3 %    Monocyte % 7.1 5.0 - 12.0 %    Eosinophil % 1.2 0.3 - 6.2 %    Basophil % 0.6 0.0 - 1.5 %    Immature Grans % 0.2 0.0 - 0.5 %    Neutrophils, Absolute 3.62 1.70 - 7.00 10*3/mm3    Lymphocytes, Absolute 2.25 0.70 - 3.10 10*3/mm3    Monocytes, Absolute 0.46 0.10 - 0.90 10*3/mm3    Eosinophils, Absolute 0.08 0.00 - 0.40 10*3/mm3    Basophils, Absolute 0.04 0.00 - 0.20 10*3/mm3    Immature Grans, Absolute 0.01 0.00 - 0.05 10*3/mm3    nRBC 0.0 0.0 - 0.2 /100 WBC   Troponin    Collection Time: 09/25/19 12:10 PM   Result Value Ref Range    Troponin T <0.010 0.000 - 0.030 ng/mL     Note: In addition to lab results from this visit, the labs listed above may include labs taken at another facility or during a different encounter within the last 24 hours. Please correlate lab times with ED admission and discharge times for further clarification of the services performed during this visit.    XR Chest 1 View   Final Result   Unremarkable  "portable chest.        This report was finalized on 9/25/2019 10:45 AM by Jadiel Worthington MD.        Vitals:    09/25/19 1000 09/25/19 1007   BP: 129/95 120/76   BP Location: Left arm    Patient Position: Sitting    Pulse: 87 86   Resp: 20    Temp: 97.8 °F (36.6 °C)    TempSrc: Oral    SpO2: 100%    Weight: 49.9 kg (110 lb)    Height: 157.5 cm (62\")      Medications   sodium chloride 0.9 % flush 10 mL (not administered)     ECG/EMG Results (last 24 hours)     ** No results found for the last 24 hours. **        ECG 12 Lead    (Results Pending)                     MDM    Final diagnoses:   Atypical chest pain              Yasemin Sigala APRN  09/25/19 1252       Yasemin Sigala, SHAWN  09/25/19 1253    "

## 2020-10-07 PROCEDURE — U0004 COV-19 TEST NON-CDC HGH THRU: HCPCS | Performed by: NURSE PRACTITIONER

## 2021-03-22 ENCOUNTER — LAB (OUTPATIENT)
Dept: LAB | Facility: HOSPITAL | Age: 36
End: 2021-03-22

## 2021-03-22 ENCOUNTER — TRANSCRIBE ORDERS (OUTPATIENT)
Dept: LAB | Facility: HOSPITAL | Age: 36
End: 2021-03-22

## 2021-03-22 DIAGNOSIS — K60.2 ANAL FISSURE: ICD-10-CM

## 2021-03-22 DIAGNOSIS — K60.2 ANAL FISSURE: Primary | ICD-10-CM

## 2021-03-22 DIAGNOSIS — K62.5 HEMORRHAGE OF RECTUM AND ANUS: ICD-10-CM

## 2021-03-22 LAB
DEPRECATED RDW RBC AUTO: 40.7 FL (ref 37–54)
ERYTHROCYTE [DISTWIDTH] IN BLOOD BY AUTOMATED COUNT: 11.9 % (ref 12.3–15.4)
HCT VFR BLD AUTO: 44.1 % (ref 34–46.6)
HGB BLD-MCNC: 14.8 G/DL (ref 12–15.9)
MCH RBC QN AUTO: 31.6 PG (ref 26.6–33)
MCHC RBC AUTO-ENTMCNC: 33.6 G/DL (ref 31.5–35.7)
MCV RBC AUTO: 94.2 FL (ref 79–97)
PLATELET # BLD AUTO: 235 10*3/MM3 (ref 140–450)
PMV BLD AUTO: 11.3 FL (ref 6–12)
RBC # BLD AUTO: 4.68 10*6/MM3 (ref 3.77–5.28)
WBC # BLD AUTO: 5.76 10*3/MM3 (ref 3.4–10.8)

## 2021-03-22 PROCEDURE — 83516 IMMUNOASSAY NONANTIBODY: CPT

## 2021-03-22 PROCEDURE — 82784 ASSAY IGA/IGD/IGG/IGM EACH: CPT

## 2021-03-22 PROCEDURE — 85027 COMPLETE CBC AUTOMATED: CPT

## 2021-03-22 PROCEDURE — 36415 COLL VENOUS BLD VENIPUNCTURE: CPT

## 2021-03-23 LAB
GLIADIN PEPTIDE IGA SER-ACNC: 2 UNITS (ref 0–19)
GLIADIN PEPTIDE IGG SER-ACNC: 1 UNITS (ref 0–19)
IGA SERPL-MCNC: 106 MG/DL (ref 87–352)
TTG IGA SER-ACNC: <2 U/ML (ref 0–3)
TTG IGG SER-ACNC: <2 U/ML (ref 0–5)

## 2022-07-06 ENCOUNTER — TRANSCRIBE ORDERS (OUTPATIENT)
Dept: ADMINISTRATIVE | Facility: HOSPITAL | Age: 37
End: 2022-07-06

## 2022-07-06 DIAGNOSIS — R63.4 WEIGHT LOSS: Primary | ICD-10-CM

## 2022-07-19 ENCOUNTER — APPOINTMENT (OUTPATIENT)
Dept: CT IMAGING | Facility: HOSPITAL | Age: 37
End: 2022-07-19

## 2022-09-16 ENCOUNTER — TRANSCRIBE ORDERS (OUTPATIENT)
Dept: ADMINISTRATIVE | Facility: HOSPITAL | Age: 37
End: 2022-09-16

## 2022-09-16 DIAGNOSIS — R19.8 CHANGE IN BOWEL MOVEMENT: Primary | ICD-10-CM

## 2022-09-30 ENCOUNTER — HOSPITAL ENCOUNTER (OUTPATIENT)
Dept: CT IMAGING | Facility: HOSPITAL | Age: 37
Discharge: HOME | End: 2022-09-30
Admitting: INTERNAL MEDICINE

## 2022-09-30 DIAGNOSIS — R19.8 CHANGE IN BOWEL MOVEMENT: ICD-10-CM

## 2022-09-30 PROCEDURE — 74177 CT ABD & PELVIS W/CONTRAST: CPT

## 2022-09-30 PROCEDURE — 25010000002 IOPAMIDOL 61 % SOLUTION: Performed by: INTERNAL MEDICINE

## 2022-09-30 PROCEDURE — 0 DIATRIZOATE MEGLUMINE & SODIUM PER 1 ML: Performed by: INTERNAL MEDICINE

## 2022-09-30 RX ADMIN — IOPAMIDOL 100 ML: 612 INJECTION, SOLUTION INTRAVENOUS at 18:12

## 2022-09-30 RX ADMIN — DIATRIZOATE MEGLUMINE AND DIATRIZOATE SODIUM 30 ML: 660; 100 LIQUID ORAL; RECTAL at 18:12

## 2022-12-19 ENCOUNTER — TRANSCRIBE ORDERS (OUTPATIENT)
Dept: ADMINISTRATIVE | Facility: HOSPITAL | Age: 37
End: 2022-12-19

## 2022-12-19 DIAGNOSIS — N28.1 RENAL CYST: Primary | ICD-10-CM

## 2023-01-05 ENCOUNTER — HOSPITAL ENCOUNTER (OUTPATIENT)
Dept: ULTRASOUND IMAGING | Facility: HOSPITAL | Age: 38
Discharge: HOME | End: 2023-01-05
Admitting: NURSE PRACTITIONER
Payer: COMMERCIAL

## 2023-01-05 DIAGNOSIS — N28.1 RENAL CYST: ICD-10-CM

## 2023-01-05 PROCEDURE — 76775 US EXAM ABDO BACK WALL LIM: CPT

## 2023-03-02 ENCOUNTER — OFFICE VISIT (OUTPATIENT)
Dept: OBSTETRICS AND GYNECOLOGY | Facility: CLINIC | Age: 38
End: 2023-03-02
Payer: COMMERCIAL

## 2023-03-02 VITALS
BODY MASS INDEX: 20.5 KG/M2 | HEIGHT: 62 IN | DIASTOLIC BLOOD PRESSURE: 68 MMHG | SYSTOLIC BLOOD PRESSURE: 108 MMHG | WEIGHT: 111.4 LBS

## 2023-03-02 DIAGNOSIS — R10.2 CHRONIC PELVIC PAIN IN FEMALE: Primary | ICD-10-CM

## 2023-03-02 DIAGNOSIS — N94.10 DYSPAREUNIA IN FEMALE: ICD-10-CM

## 2023-03-02 DIAGNOSIS — Z90.710 S/P HYSTERECTOMY: ICD-10-CM

## 2023-03-02 DIAGNOSIS — Z87.42 HISTORY OF ENDOMETRIOSIS: ICD-10-CM

## 2023-03-02 DIAGNOSIS — G89.29 CHRONIC PELVIC PAIN IN FEMALE: Primary | ICD-10-CM

## 2023-03-02 PROCEDURE — 99204 OFFICE O/P NEW MOD 45 MIN: CPT | Performed by: PHYSICIAN ASSISTANT

## 2023-03-02 NOTE — PROGRESS NOTES
Subjective   Chief Complaint   Patient presents with   • Follow-up     Patient had a CT scan that showed an ovarian cyst.  TVS done today       Lacey Hubbard is a 37 y.o. year old  presenting to be seen for complaints of an ovarian cyst that was noted on a CT scan a few months ago as well as pelvic pain.  Patient had a hysterectomy in  per Dr. David Pryor.  She reports at that time she was told that she had scar tissue.  She had a right salpingo-oophorectomy in  by Dr. Birones for endometriosis.  Patient reports that she is having significant bouts of lower pelvic pain that started around 2022..  The pelvic pain is not daily but when she does experience it it is very sharp and painful.  She reports the pain seems to be exacerbated by activity and movement.  She reports she has not been sexually active for 2 years because it hurts too much to have sex.  Transvaginal ultrasound done today is reviewed with patient.  Left ovary has a 1.7 cm follicle/collapsing cyst.  There is vascular flow to the left ovary.  There is small amount of free fluid.  Patient is requesting removal of her remaining ovary due to chronic pelvic pain and history of endometriosis and scar tissue.  She reports she has had a thorough GI evaluation in the last several months and has undergone colonoscopy x2.    Past Medical History:   Diagnosis Date   • Abdominal pain    • Asthma     AS A KID   • Cyst of ovary     REMOVED   • Lower back pain    • Ovarian cyst       No current outpatient medications on file.   Allergies   Allergen Reactions   • Penicillins Anaphylaxis and Hives   • Naproxen Itching     RASH/ITCHING SEVERE      Past Surgical History:   Procedure Laterality Date   • DIAGNOSTIC LAPAROSCOPY, SALPINGO OOPHORECTOMY LAPAROSCOPIC Right 2015   • HYSTERECTOMY  2019    reported by pt    • OTHER SURGICAL HISTORY Left     Broken Hand   • TUBAL ABDOMINAL LIGATION     • WISDOM TOOTH EXTRACTION       "  Social History     Socioeconomic History   • Marital status:    Tobacco Use   • Smoking status: Every Day     Packs/day: 1.00     Years: 15.00     Pack years: 15.00     Types: Cigarettes   • Smokeless tobacco: Never   Vaping Use   • Vaping Use: Never used   Substance and Sexual Activity   • Alcohol use: Yes     Comment: SPECIAL OCCASIONS   • Drug use: No   • Sexual activity: Defer     Birth control/protection: Hysterectomy      Family History   Problem Relation Age of Onset   • Breast cancer Sister        Review of Systems   Constitutional: Negative for chills, diaphoresis and fever.   Gastrointestinal: Positive for abdominal pain.   Genitourinary: Positive for dyspareunia and pelvic pain. Negative for difficulty urinating and dysuria.           Objective   /68   Ht 157.5 cm (62\")   Wt 50.5 kg (111 lb 6.4 oz)   LMP 03/21/2019 (Approximate)   BMI 20.38 kg/m²     Physical Exam  Constitutional:       Appearance: Normal appearance. She is well-developed and well-groomed.   Eyes:      General: Lids are normal.      Extraocular Movements: Extraocular movements intact.      Conjunctiva/sclera: Conjunctivae normal.   Neurological:      General: No focal deficit present.      Mental Status: She is alert and oriented to person, place, and time.   Psychiatric:         Attention and Perception: Attention normal.         Mood and Affect: Mood normal.         Speech: Speech normal.         Behavior: Behavior is cooperative.            Result Review :           US Ob Transvaginal (03/02/2023 12:52)  CT Abdomen Pelvis With Contrast (09/30/2022 18:12)          Assessment and Plan  Diagnoses and all orders for this visit:    1. Chronic pelvic pain in female (Primary)    2. S/P hysterectomy    3. Dyspareunia in female    4. History of endometriosis      Patient Instructions   Patient is counseled regarding proceeding with diagnostic laparoscopy and removal of her 1 remaining ovary.  She is advised that if her " remaining ovary is removed that that we will put her into a surgical menopause which could trigger vasomotor symptoms requiring hormone replacement therapy.  Patient voices understanding.  She is advised I will consult MD prior to scheduling surgery.  She request Dr. Briones as he previously took her ovary out.             This note was electronically signed.    Alpa Shrestha PA-C   March 6, 2023

## 2023-03-03 NOTE — PATIENT INSTRUCTIONS
Patient is counseled regarding proceeding with diagnostic laparoscopy and removal of her 1 remaining ovary.  She is advised that if her remaining ovary is removed that that we will put her into a surgical menopause which could trigger vasomotor symptoms requiring hormone replacement therapy.  Patient voices understanding.  She is advised I will consult MD prior to scheduling surgery.  She request Dr. Briones as he previously took her ovary out.

## 2023-03-06 ENCOUNTER — PREP FOR SURGERY (OUTPATIENT)
Dept: OTHER | Facility: HOSPITAL | Age: 38
End: 2023-03-06
Payer: COMMERCIAL

## 2023-03-06 ENCOUNTER — TELEPHONE (OUTPATIENT)
Dept: OBSTETRICS AND GYNECOLOGY | Facility: CLINIC | Age: 38
End: 2023-03-06
Payer: COMMERCIAL

## 2023-03-06 DIAGNOSIS — Z87.42 HISTORY OF ENDOMETRIOSIS: ICD-10-CM

## 2023-03-06 DIAGNOSIS — R10.2 CHRONIC PELVIC PAIN IN FEMALE: Primary | ICD-10-CM

## 2023-03-06 DIAGNOSIS — N94.10 FEMALE DYSPAREUNIA: ICD-10-CM

## 2023-03-06 DIAGNOSIS — G89.29 CHRONIC PELVIC PAIN IN FEMALE: Primary | ICD-10-CM

## 2023-03-06 RX ORDER — SODIUM CHLORIDE 0.9 % (FLUSH) 0.9 %
10 SYRINGE (ML) INJECTION AS NEEDED
Status: CANCELLED | OUTPATIENT
Start: 2023-03-06

## 2023-03-06 RX ORDER — SODIUM CHLORIDE 9 MG/ML
40 INJECTION, SOLUTION INTRAVENOUS AS NEEDED
Status: CANCELLED | OUTPATIENT
Start: 2023-03-06

## 2023-03-06 RX ORDER — SODIUM CHLORIDE 0.9 % (FLUSH) 0.9 %
3 SYRINGE (ML) INJECTION EVERY 12 HOURS SCHEDULED
Status: CANCELLED | OUTPATIENT
Start: 2023-03-06

## 2023-03-06 NOTE — TELEPHONE ENCOUNTER
Case request placed      ----- Message from Alonso Briones MD sent at 3/6/2023  1:23 PM EST -----  OK      ----- Message -----  From: Alpa Shrestha PA-C  Sent: 3/3/2023   8:04 AM EST  To: MD Dr. Anselmo Womack can you please review chart.  Patient is requesting to have her remaining ovary removed.  Is it okay to schedule this.

## 2023-03-07 ENCOUNTER — TELEPHONE (OUTPATIENT)
Dept: PREADMISSION TESTING | Facility: HOSPITAL | Age: 38
End: 2023-03-07

## 2023-03-07 PROBLEM — Z87.42 HISTORY OF ENDOMETRIOSIS: Status: ACTIVE | Noted: 2023-03-07

## 2023-03-07 PROBLEM — N94.10 FEMALE DYSPAREUNIA: Status: ACTIVE | Noted: 2023-03-07

## 2023-03-08 ENCOUNTER — PRE-ADMISSION TESTING (OUTPATIENT)
Dept: PREADMISSION TESTING | Facility: HOSPITAL | Age: 38
End: 2023-03-08
Payer: COMMERCIAL

## 2023-03-08 VITALS — WEIGHT: 110.8 LBS | BODY MASS INDEX: 20.27 KG/M2

## 2023-03-08 DIAGNOSIS — Z87.42 HISTORY OF ENDOMETRIOSIS: ICD-10-CM

## 2023-03-08 DIAGNOSIS — N94.10 FEMALE DYSPAREUNIA: ICD-10-CM

## 2023-03-08 DIAGNOSIS — G89.29 CHRONIC PELVIC PAIN IN FEMALE: ICD-10-CM

## 2023-03-08 DIAGNOSIS — R10.2 CHRONIC PELVIC PAIN IN FEMALE: ICD-10-CM

## 2023-03-08 LAB
BASOPHILS # BLD AUTO: 0.06 10*3/MM3 (ref 0–0.2)
BASOPHILS NFR BLD AUTO: 1.1 % (ref 0–1.5)
BILIRUB UR QL STRIP: NEGATIVE
CLARITY UR: CLEAR
COLOR UR: YELLOW
DEPRECATED RDW RBC AUTO: 41.2 FL (ref 37–54)
EOSINOPHIL # BLD AUTO: 0.11 10*3/MM3 (ref 0–0.4)
EOSINOPHIL NFR BLD AUTO: 1.9 % (ref 0.3–6.2)
ERYTHROCYTE [DISTWIDTH] IN BLOOD BY AUTOMATED COUNT: 12.3 % (ref 12.3–15.4)
GLUCOSE UR STRIP-MCNC: NEGATIVE MG/DL
HCT VFR BLD AUTO: 40.7 % (ref 34–46.6)
HGB BLD-MCNC: 14.2 G/DL (ref 12–15.9)
HGB UR QL STRIP.AUTO: NEGATIVE
IMM GRANULOCYTES # BLD AUTO: 0.02 10*3/MM3 (ref 0–0.05)
IMM GRANULOCYTES NFR BLD AUTO: 0.4 % (ref 0–0.5)
KETONES UR QL STRIP: NEGATIVE
LEUKOCYTE ESTERASE UR QL STRIP.AUTO: NEGATIVE
LYMPHOCYTES # BLD AUTO: 2.37 10*3/MM3 (ref 0.7–3.1)
LYMPHOCYTES NFR BLD AUTO: 41.6 % (ref 19.6–45.3)
MCH RBC QN AUTO: 31.8 PG (ref 26.6–33)
MCHC RBC AUTO-ENTMCNC: 34.9 G/DL (ref 31.5–35.7)
MCV RBC AUTO: 91.3 FL (ref 79–97)
MONOCYTES # BLD AUTO: 0.37 10*3/MM3 (ref 0.1–0.9)
MONOCYTES NFR BLD AUTO: 6.5 % (ref 5–12)
NEUTROPHILS NFR BLD AUTO: 2.77 10*3/MM3 (ref 1.7–7)
NEUTROPHILS NFR BLD AUTO: 48.5 % (ref 42.7–76)
NITRITE UR QL STRIP: NEGATIVE
NRBC BLD AUTO-RTO: 0 /100 WBC (ref 0–0.2)
PH UR STRIP.AUTO: 5.5 [PH] (ref 5–8)
PLATELET # BLD AUTO: 172 10*3/MM3 (ref 140–450)
PMV BLD AUTO: 10.8 FL (ref 6–12)
PROT UR QL STRIP: NEGATIVE
RBC # BLD AUTO: 4.46 10*6/MM3 (ref 3.77–5.28)
SP GR UR STRIP: 1.01 (ref 1–1.03)
UROBILINOGEN UR QL STRIP: NORMAL
WBC NRBC COR # BLD: 5.7 10*3/MM3 (ref 3.4–10.8)

## 2023-03-08 PROCEDURE — 36415 COLL VENOUS BLD VENIPUNCTURE: CPT

## 2023-03-08 PROCEDURE — 85025 COMPLETE CBC W/AUTO DIFF WBC: CPT

## 2023-03-08 PROCEDURE — 81003 URINALYSIS AUTO W/O SCOPE: CPT

## 2023-03-08 RX ORDER — ACETAMINOPHEN 325 MG/1
650 TABLET ORAL AS NEEDED
Status: ON HOLD | COMMUNITY
End: 2023-03-16 | Stop reason: SDUPTHER

## 2023-03-08 NOTE — DISCHARGE INSTRUCTIONS
Introduction to anesthesia video viewed by pt in PAT.   PAT PASS GIVEN/REVIEWED WITH PT.  VERBALIZED UNDERSTANDING OF THE FOLLOWING:  DO NOT EAT, DRINK, SMOKE, USE SMOKELESS TOBACCO OR CHEW GUM AFTER MIDNIGHT THE NIGHT BEFORE SURGERY.  THIS ALSO INCLUDES HARD CANDIES AND MINTS.    DO NOT SHAVE THE AREA TO BE OPERATED ON AT LEAST 48 HOURS PRIOR TO THE PROCEDURE.  DO NOT WEAR MAKE UP OR NAIL POLISH.  DO NOT LEAVE IN ANY PIERCING OR WEAR JEWELRY THE DAY OF SURGERY.      DO NOT USE ADHESIVES IF YOU WEAR DENTURES.    DO NOT WEAR EYE CONTACTS; BRING IN YOUR GLASSES.    ONLY TAKE MEDICATION THE MORNING OF YOUR PROCEDURE IF INSTRUCTED BY YOUR SURGEON WITH ENOUGH WATER TO SWALLOW THE MEDICATION.  IF YOUR SURGEON DID NOT SPECIFY WHICH MEDICATIONS TO TAKE, YOU WILL NEED TO CALL THEIR OFFICE FOR FURTHER INSTRUCTIONS AND DO AS THEY INSTRUCT.    LEAVE ANYTHING YOU CONSIDER VALUABLE AT HOME.    YOU WILL NEED TO ARRANGE FOR SOMEONE TO DRIVE YOU HOME AFTER SURGERY.  IT IS RECOMMENDED THAT YOU DO NOT DRIVE, WORK, DRINK ALCOHOL OR MAKE MAJOR DECISIONS FOR AT LEAST 24 HOURS AFTER YOUR PROCEDURE IS COMPLETE.      THE DAY OF YOUR PROCEDURE, BRING IN THE FOLLOWING IF APPLICABLE:   PICTURE ID AND INSURANCE/MEDICARE OR MEDICAID CARDS/ANY CO-PAY THAT MAY BE DUE   COPY OF ADVANCED DIRECTIVE/LIVING WILL/POWER OR    CPAP/BIPAP/INHALERS   SKIN PREP SHEET   YOUR PREADMISSION TESTING PASS (IF NOT A PHONE HISTORY)       Chlorhexadine wipes along with instruction/verification sheet given to pt.  Instructed pt to date, time, and initial the verification sheet once skin prep has been  completed, and to return to [facility] the day of the procedure.  Pt. Verbalizes understanding.

## 2023-03-16 ENCOUNTER — HOSPITAL ENCOUNTER (OUTPATIENT)
Facility: HOSPITAL | Age: 38
Setting detail: HOSPITAL OUTPATIENT SURGERY
Discharge: HOME | End: 2023-03-16
Attending: STUDENT IN AN ORGANIZED HEALTH CARE EDUCATION/TRAINING PROGRAM | Admitting: STUDENT IN AN ORGANIZED HEALTH CARE EDUCATION/TRAINING PROGRAM
Payer: COMMERCIAL

## 2023-03-16 ENCOUNTER — ANESTHESIA (OUTPATIENT)
Dept: PERIOP | Facility: HOSPITAL | Age: 38
End: 2023-03-16
Payer: COMMERCIAL

## 2023-03-16 ENCOUNTER — ANESTHESIA EVENT (OUTPATIENT)
Dept: PERIOP | Facility: HOSPITAL | Age: 38
End: 2023-03-16
Payer: COMMERCIAL

## 2023-03-16 VITALS
SYSTOLIC BLOOD PRESSURE: 114 MMHG | TEMPERATURE: 97 F | RESPIRATION RATE: 20 BRPM | DIASTOLIC BLOOD PRESSURE: 48 MMHG | OXYGEN SATURATION: 95 % | HEART RATE: 68 BPM

## 2023-03-16 DIAGNOSIS — N94.10 FEMALE DYSPAREUNIA: ICD-10-CM

## 2023-03-16 DIAGNOSIS — R10.2 CHRONIC PELVIC PAIN IN FEMALE: ICD-10-CM

## 2023-03-16 DIAGNOSIS — R10.2 PELVIC PAIN: Primary | ICD-10-CM

## 2023-03-16 DIAGNOSIS — Z98.890 POST-OPERATIVE STATE: ICD-10-CM

## 2023-03-16 DIAGNOSIS — G89.29 CHRONIC PELVIC PAIN IN FEMALE: ICD-10-CM

## 2023-03-16 DIAGNOSIS — Z87.42 HISTORY OF ENDOMETRIOSIS: ICD-10-CM

## 2023-03-16 PROCEDURE — 58661 LAPAROSCOPY REMOVE ADNEXA: CPT | Performed by: STUDENT IN AN ORGANIZED HEALTH CARE EDUCATION/TRAINING PROGRAM

## 2023-03-16 PROCEDURE — C1894 INTRO/SHEATH, NON-LASER: HCPCS | Performed by: STUDENT IN AN ORGANIZED HEALTH CARE EDUCATION/TRAINING PROGRAM

## 2023-03-16 PROCEDURE — 25010000002 ONDANSETRON PER 1 MG: Performed by: NURSE ANESTHETIST, CERTIFIED REGISTERED

## 2023-03-16 PROCEDURE — 25010000002 HYDROMORPHONE 1 MG/ML SOLUTION: Performed by: NURSE ANESTHETIST, CERTIFIED REGISTERED

## 2023-03-16 PROCEDURE — 25010000002 DEXAMETHASONE PER 1 MG: Performed by: NURSE ANESTHETIST, CERTIFIED REGISTERED

## 2023-03-16 PROCEDURE — S0260 H&P FOR SURGERY: HCPCS | Performed by: STUDENT IN AN ORGANIZED HEALTH CARE EDUCATION/TRAINING PROGRAM

## 2023-03-16 PROCEDURE — 25010000002 PROPOFOL 200 MG/20ML EMULSION: Performed by: NURSE ANESTHETIST, CERTIFIED REGISTERED

## 2023-03-16 PROCEDURE — 25010000002 HYDROMORPHONE PER 4 MG: Performed by: NURSE ANESTHETIST, CERTIFIED REGISTERED

## 2023-03-16 PROCEDURE — 25010000002 FENTANYL CITRATE (PF) 100 MCG/2ML SOLUTION: Performed by: NURSE ANESTHETIST, CERTIFIED REGISTERED

## 2023-03-16 PROCEDURE — 25010000002 MIDAZOLAM PER 1MG: Performed by: NURSE ANESTHETIST, CERTIFIED REGISTERED

## 2023-03-16 RX ORDER — FENTANYL CITRATE 50 UG/ML
INJECTION, SOLUTION INTRAMUSCULAR; INTRAVENOUS AS NEEDED
Status: DISCONTINUED | OUTPATIENT
Start: 2023-03-16 | End: 2023-03-16 | Stop reason: SURG

## 2023-03-16 RX ORDER — MIDAZOLAM HYDROCHLORIDE 2 MG/2ML
INJECTION, SOLUTION INTRAMUSCULAR; INTRAVENOUS AS NEEDED
Status: DISCONTINUED | OUTPATIENT
Start: 2023-03-16 | End: 2023-03-16 | Stop reason: SURG

## 2023-03-16 RX ORDER — SODIUM CHLORIDE 0.9 % (FLUSH) 0.9 %
10 SYRINGE (ML) INJECTION AS NEEDED
Status: DISCONTINUED | OUTPATIENT
Start: 2023-03-16 | End: 2023-03-16 | Stop reason: HOSPADM

## 2023-03-16 RX ORDER — PROCHLORPERAZINE EDISYLATE 5 MG/ML
10 INJECTION INTRAMUSCULAR; INTRAVENOUS ONCE AS NEEDED
Status: DISCONTINUED | OUTPATIENT
Start: 2023-03-16 | End: 2023-03-16 | Stop reason: HOSPADM

## 2023-03-16 RX ORDER — SODIUM CHLORIDE, SODIUM LACTATE, POTASSIUM CHLORIDE, CALCIUM CHLORIDE 600; 310; 30; 20 MG/100ML; MG/100ML; MG/100ML; MG/100ML
1000 INJECTION, SOLUTION INTRAVENOUS CONTINUOUS
Status: DISCONTINUED | OUTPATIENT
Start: 2023-03-16 | End: 2023-03-16 | Stop reason: HOSPADM

## 2023-03-16 RX ORDER — IPRATROPIUM BROMIDE AND ALBUTEROL SULFATE 2.5; .5 MG/3ML; MG/3ML
3 SOLUTION RESPIRATORY (INHALATION) ONCE AS NEEDED
Status: DISCONTINUED | OUTPATIENT
Start: 2023-03-16 | End: 2023-03-16 | Stop reason: HOSPADM

## 2023-03-16 RX ORDER — ROCURONIUM BROMIDE 10 MG/ML
INJECTION, SOLUTION INTRAVENOUS AS NEEDED
Status: DISCONTINUED | OUTPATIENT
Start: 2023-03-16 | End: 2023-03-16 | Stop reason: SURG

## 2023-03-16 RX ORDER — MEPERIDINE HYDROCHLORIDE 25 MG/ML
12.5 INJECTION INTRAMUSCULAR; INTRAVENOUS; SUBCUTANEOUS
Status: DISCONTINUED | OUTPATIENT
Start: 2023-03-16 | End: 2023-03-16 | Stop reason: HOSPADM

## 2023-03-16 RX ORDER — ACETAMINOPHEN 500 MG
1000 TABLET ORAL EVERY 6 HOURS PRN
Qty: 60 TABLET | Refills: 0 | Status: SHIPPED | OUTPATIENT
Start: 2023-03-16

## 2023-03-16 RX ORDER — DEXAMETHASONE SODIUM PHOSPHATE 4 MG/ML
INJECTION, SOLUTION INTRA-ARTICULAR; INTRALESIONAL; INTRAMUSCULAR; INTRAVENOUS; SOFT TISSUE AS NEEDED
Status: DISCONTINUED | OUTPATIENT
Start: 2023-03-16 | End: 2023-03-16 | Stop reason: SURG

## 2023-03-16 RX ORDER — ONDANSETRON 2 MG/ML
INJECTION INTRAMUSCULAR; INTRAVENOUS AS NEEDED
Status: DISCONTINUED | OUTPATIENT
Start: 2023-03-16 | End: 2023-03-16 | Stop reason: SURG

## 2023-03-16 RX ORDER — LORAZEPAM 2 MG/ML
1 INJECTION INTRAMUSCULAR
Status: DISCONTINUED | OUTPATIENT
Start: 2023-03-16 | End: 2023-03-16 | Stop reason: HOSPADM

## 2023-03-16 RX ORDER — SODIUM CHLORIDE 9 MG/ML
40 INJECTION, SOLUTION INTRAVENOUS AS NEEDED
Status: DISCONTINUED | OUTPATIENT
Start: 2023-03-16 | End: 2023-03-16 | Stop reason: HOSPADM

## 2023-03-16 RX ORDER — BUPIVACAINE HYDROCHLORIDE 2.5 MG/ML
INJECTION, SOLUTION EPIDURAL; INFILTRATION; INTRACAUDAL AS NEEDED
Status: DISCONTINUED | OUTPATIENT
Start: 2023-03-16 | End: 2023-03-16 | Stop reason: HOSPADM

## 2023-03-16 RX ORDER — ONDANSETRON 2 MG/ML
4 INJECTION INTRAMUSCULAR; INTRAVENOUS ONCE AS NEEDED
Status: DISCONTINUED | OUTPATIENT
Start: 2023-03-16 | End: 2023-03-16 | Stop reason: HOSPADM

## 2023-03-16 RX ORDER — LIDOCAINE HYDROCHLORIDE 20 MG/ML
INJECTION, SOLUTION INTRAVENOUS AS NEEDED
Status: DISCONTINUED | OUTPATIENT
Start: 2023-03-16 | End: 2023-03-16 | Stop reason: SURG

## 2023-03-16 RX ORDER — HYDROMORPHONE HCL 110MG/55ML
PATIENT CONTROLLED ANALGESIA SYRINGE INTRAVENOUS AS NEEDED
Status: DISCONTINUED | OUTPATIENT
Start: 2023-03-16 | End: 2023-03-16 | Stop reason: SURG

## 2023-03-16 RX ORDER — PROPOFOL 10 MG/ML
INJECTION, EMULSION INTRAVENOUS AS NEEDED
Status: DISCONTINUED | OUTPATIENT
Start: 2023-03-16 | End: 2023-03-16 | Stop reason: SURG

## 2023-03-16 RX ORDER — OXYCODONE HYDROCHLORIDE 5 MG/1
5 TABLET ORAL EVERY 4 HOURS PRN
Qty: 12 TABLET | Refills: 0 | Status: SHIPPED | OUTPATIENT
Start: 2023-03-16 | End: 2023-03-23

## 2023-03-16 RX ORDER — SODIUM CHLORIDE 0.9 % (FLUSH) 0.9 %
3 SYRINGE (ML) INJECTION EVERY 12 HOURS SCHEDULED
Status: DISCONTINUED | OUTPATIENT
Start: 2023-03-16 | End: 2023-03-16 | Stop reason: HOSPADM

## 2023-03-16 RX ORDER — NEOSTIGMINE METHYLSULFATE 5 MG/5 ML
SYRINGE (ML) INTRAVENOUS AS NEEDED
Status: DISCONTINUED | OUTPATIENT
Start: 2023-03-16 | End: 2023-03-16 | Stop reason: SURG

## 2023-03-16 RX ADMIN — HYDROMORPHONE HYDROCHLORIDE 0.5 MG: 2 INJECTION, SOLUTION INTRAMUSCULAR; INTRAVENOUS; SUBCUTANEOUS at 11:13

## 2023-03-16 RX ADMIN — LIDOCAINE HYDROCHLORIDE 60 MG: 20 INJECTION, SOLUTION INTRAVENOUS at 10:24

## 2023-03-16 RX ADMIN — ONDANSETRON 4 MG: 2 INJECTION INTRAMUSCULAR; INTRAVENOUS at 10:51

## 2023-03-16 RX ADMIN — ROCURONIUM BROMIDE 40 MG: 10 INJECTION INTRAVENOUS at 10:24

## 2023-03-16 RX ADMIN — FENTANYL CITRATE 50 MCG: 50 INJECTION INTRAMUSCULAR; INTRAVENOUS at 10:28

## 2023-03-16 RX ADMIN — HYDROMORPHONE HYDROCHLORIDE 0.5 MG: 2 INJECTION, SOLUTION INTRAMUSCULAR; INTRAVENOUS; SUBCUTANEOUS at 10:47

## 2023-03-16 RX ADMIN — MIDAZOLAM HYDROCHLORIDE 2 MG: 1 INJECTION, SOLUTION INTRAMUSCULAR; INTRAVENOUS at 11:27

## 2023-03-16 RX ADMIN — PROPOFOL 200 MG: 10 INJECTION, EMULSION INTRAVENOUS at 10:24

## 2023-03-16 RX ADMIN — MIDAZOLAM HYDROCHLORIDE 2 MG: 1 INJECTION, SOLUTION INTRAMUSCULAR; INTRAVENOUS at 10:19

## 2023-03-16 RX ADMIN — GLYCOPYRROLATE 0.4 MG: 0.2 INJECTION, SOLUTION INTRAMUSCULAR; INTRAVENOUS at 11:16

## 2023-03-16 RX ADMIN — SODIUM CHLORIDE, POTASSIUM CHLORIDE, SODIUM LACTATE AND CALCIUM CHLORIDE 1000 ML: 600; 310; 30; 20 INJECTION, SOLUTION INTRAVENOUS at 07:42

## 2023-03-16 RX ADMIN — HYDROMORPHONE HYDROCHLORIDE 0.5 MG: 2 INJECTION, SOLUTION INTRAMUSCULAR; INTRAVENOUS; SUBCUTANEOUS at 10:58

## 2023-03-16 RX ADMIN — HYDROMORPHONE HYDROCHLORIDE 0.5 MG: 1 INJECTION, SOLUTION INTRAMUSCULAR; INTRAVENOUS; SUBCUTANEOUS at 12:21

## 2023-03-16 RX ADMIN — HYDROMORPHONE HYDROCHLORIDE 0.5 MG: 2 INJECTION, SOLUTION INTRAMUSCULAR; INTRAVENOUS; SUBCUTANEOUS at 11:22

## 2023-03-16 RX ADMIN — DEXAMETHASONE SODIUM PHOSPHATE 4 MG: 4 INJECTION, SOLUTION INTRAMUSCULAR; INTRAVENOUS at 10:51

## 2023-03-16 RX ADMIN — SODIUM CHLORIDE, POTASSIUM CHLORIDE, SODIUM LACTATE AND CALCIUM CHLORIDE: 600; 310; 30; 20 INJECTION, SOLUTION INTRAVENOUS at 11:04

## 2023-03-16 RX ADMIN — Medication 3 MG: at 11:16

## 2023-03-16 RX ADMIN — FENTANYL CITRATE 50 MCG: 50 INJECTION INTRAMUSCULAR; INTRAVENOUS at 10:19

## 2023-03-16 NOTE — H&P
GYNECOLOGY HISTORY AND PHYSICAL    CHIEF COMPLAINT: Scheduled surgery    DIAGNOSIS:  Chronic pelvic pain  Status post right salpingo-oophorectomy  Status post hysterectomy    ASSESSMENT/PLAN     37 y.o.  female who presents for scheduled diagnostic laparoscopy, possible left salpingo-oophorectomy, possible lysis of adhesions and all other indicated procedures.    - Proceed to the OR for scheduled surgery  - Risks for the procedure reviewed  - SCDs for DVT ppx  - Antibiotics: None indicated.    HISTORY OF PRESENT ILLNESS     37 y.o.  female who presents for the scheduled procedure listed above. Lacey presented to see RONIT Rosenberg in clinic for follow up of an ovarian cyst noted on CT scan. At the time of her visit she reported the following symptoms:   Patient reports that she is having significant bouts of lower pelvic pain that started around 2022. The pelvic pain is not daily but when she does experience it it is very sharp and painful.  She reports the pain seems to be exacerbated by activity and movement. She reports she has not been sexually active for 2 years because it hurts too much to have sex.  Ultrasound performed that day in clinic revealed a 1.7 cm follicle/ collapsing cyst with normal vascular flow to the ovary. Lacey requested to proceed with oophorectomy due to reported history of endometriosis, chronic pelvic pain and adhesive disease. She has been counseled previously that removing this ovary will put her in iatrogenic menopause. Today, I discussed with her the risks/ benefits of removing her ovary, including decreased cardiovascular and musculoskeletal protection. We also discussed the possibility that this will not improve her pain. We reviewed that the prior Op report noted endometriosis implants on the ovary but nowhere else in the pelvis. We also reviewed that Pathology did not specifically note the presence of endometriosis on the ovary. We discussed the risk  of encountering a large amount of adhesive disease and the need to abort and refer to a specialist in minimally invasive GYN surgery in [place]. Finally, we reviewed the risks of surgery in general, including bowel/ bladder injury, bleeding, and infection. Lacey voiced understanding of all of these risks and wishes to proceed.    REVIEW OF SYSTEMS: A complete review of systems was performed and was specifically negative for headache, changes in vision, RUQ pain, shortness of breath, chest pain, lower extremity edema and dysuria.     HISTORY:  Obstetrical History:  OB History    Para Term  AB Living   1 1 1     1   SAB IAB Ectopic Molar Multiple Live Births                    # Outcome Date GA Lbr Mike/2nd Weight Sex Delivery Anes PTL Lv   1 Term              Past Medical History:  Past Medical History:   Diagnosis Date   • Abdominal pain    • Anxiety    • Asthma     AS A KID   • Cyst of ovary     REMOVED   • Lower back pain    • Ovarian cyst    • Stress incontinence     with coughing, laughing     Past Surgical History:  Past Surgical History:   Procedure Laterality Date   • DIAGNOSTIC LAPAROSCOPY, SALPINGO OOPHORECTOMY LAPAROSCOPIC Right 2015   • HYSTERECTOMY  2019    reported by pt    • OTHER SURGICAL HISTORY Left     Broken Hand   • TUBAL ABDOMINAL LIGATION     • WISDOM TOOTH EXTRACTION       Social History:  Social History     Tobacco Use   • Smoking status: Every Day     Packs/day: 0.50     Years: 15.00     Pack years: 7.50     Types: Cigarettes   • Smokeless tobacco: Never   • Tobacco comments:     Cut back from 1.5 ppd to 0.5 ppd   Vaping Use   • Vaping Use: Never used   Substance Use Topics   • Alcohol use: Not Currently   • Drug use: Yes     Types: Marijuana     Comment: rarely     Family History  Family History   Problem Relation Age of Onset   • Breast cancer Sister       Allergies:   Allergies   Allergen Reactions   • Penicillins Anaphylaxis and Hives   • Naproxen Itching      RASH/ITCHING SEVERE       OBJECTIVE   VITALS:   Temp:  [97.7 °F (36.5 °C)] 97.7 °F (36.5 °C)  Heart Rate:  [69] 69  Resp:  [14] 14  BP: (94)/(67) 94/67    PHYSICAL EXAM:  GENERAL: NAD, alert  CHEST: No increased work of breathing, CTAB  CV: RRR, WWP  ABDOMEN: Soft, NTTP  EXTREMITIES:  Warm and well-perfused, nontender, nonedematous  NEURO: AAO x 3, CN II-XII grossly intact    No current facility-administered medications on file prior to encounter.     No current outpatient medications on file prior to encounter.     DIAGNOSTIC STUDIES:    Latest Reference Range & Units Most Recent   WBC 3.40 - 10.80 10*3/mm3 5.70  3/8/23 16:44   RBC 3.77 - 5.28 10*6/mm3 4.46  3/8/23 16:44   Hemoglobin 12.0 - 15.9 g/dL 14.2  3/8/23 16:44   Hematocrit 34.0 - 46.6 % 40.7  3/8/23 16:44   RDW 12.3 - 15.4 % 12.3  3/8/23 16:44   MCV 79.0 - 97.0 fL 91.3  3/8/23 16:44   MCH 26.6 - 33.0 pg 31.8  3/8/23 16:44   MCHC 31.5 - 35.7 g/dL 34.9  3/8/23 16:44   MPV 6.0 - 12.0 fL 10.8  3/8/23 16:44   Platelets 140 - 450 10*3/mm3 172  3/8/23 16:44   RDW-SD 37.0 - 54.0 fl 41.2  3/8/23 16:44      Latest Reference Range & Units Most Recent   Color, UA Yellow, Straw  Yellow  3/8/23 16:44   Appearance, UA Clear  Clear  3/8/23 16:44   Specific Gravity, UA 1.005 - 1.030  1.007  3/8/23 16:44   pH, UA 5.0 - 8.0  5.5  3/8/23 16:44   Glucose Negative  Negative  3/8/23 16:44   Ketones, UA Negative  Negative  3/8/23 16:44   Blood, UA Negative  Negative  3/8/23 16:44   Nitrite, UA Negative  Negative  3/8/23 16:44   Leukocytes, UA Negative  Negative  3/8/23 16:44   Protein, UA Negative  Negative  3/8/23 16:44   Bilirubin, UA Negative  Negative  3/8/23 16:44   Urobilinogen, UA 0.2 - 1.0 E.U./dL  0.2 E.U./dL  3/8/23 16:44       Kylie Alexander MD   Obstetrics and Gynecology  [facility]

## 2023-03-16 NOTE — ANESTHESIA PROCEDURE NOTES
Airway  Urgency: elective    Date/Time: 3/16/2023 10:25 AM  Airway not difficult    General Information and Staff    Patient location during procedure: OR  CRNA/CAA: Rosa Garza CRNA    Indications and Patient Condition  Indications for airway management: airway protection    Preoxygenated: yes  MILS not maintained throughout  Mask difficulty assessment: 1 - vent by mask    Final Airway Details  Final airway type: endotracheal airway      Successful airway: ETT  Cuffed: yes   Successful intubation technique: direct laryngoscopy  Facilitating devices/methods: intubating stylet  Endotracheal tube insertion site: oral  Blade: Mark  Blade size: 3  ETT size (mm): 7.0  Cormack-Lehane Classification: grade I - full view of glottis  Placement verified by: chest auscultation and capnometry   Cuff volume (mL): 5  Measured from: lips  ETT/EBT  to lips (cm): 21  Number of attempts at approach: 1  Assessment: lips, teeth, and gum same as pre-op and atraumatic intubation    Additional Comments  Negative epigastric sounds, Breath sound equal bilaterally with symmetric chest rise and fall

## 2023-03-16 NOTE — ANESTHESIA PREPROCEDURE EVALUATION
Anesthesia Evaluation     Patient summary reviewed and Nursing notes reviewed   NPO Solid Status: > 8 hours  NPO Liquid Status: > 8 hours           Airway   Mallampati: II  TM distance: >3 FB  Neck ROM: full  Possible difficult intubation  Dental      Pulmonary    (+) a smoker Current Abstained day of surgery, asthma,  Cardiovascular         Neuro/Psych  (+) psychiatric history,    GI/Hepatic/Renal/Endo      Musculoskeletal     Abdominal    Substance History      OB/GYN          Other                      Anesthesia Plan    ASA 2     general     intravenous induction     Anesthetic plan, risks, benefits, and alternatives have been provided, discussed and informed consent has been obtained with: patient.  Pre-procedure education provided  Plan discussed with CRNA.        CODE STATUS:

## 2023-03-16 NOTE — OP NOTE
Johnson  Lacey Hubbard  : [id number]  CSN: [id number]  Date: 3/16/2023    Operative Note    Pre-op Diagnosis:  Chronic pelvic pain in female [R10.2, G89.29]  Female dyspareunia [N94.10]  History of endometriosis [Z87.42]   Post-op Diagnosis:  Same    Procedure: Diagnostic laparoscopy  Left oophorectomy   Surgeon: Kylie Alexander MD    Surgical assistant: Taty Dozier was responsible for performing the following activities: Retraction and Held/Positioned Camera and their skilled assistance was necessary for the success of this case.   Anesthesia Staff: CRNA: Rosa Garza CRNA    Anesthesia: General   OR Staff: Circulator: Laura Delgadillo RN  Scrub Person: Tasha Wilson PCT; Taty Dozier   Specimens:  Left ovary   Estimated Blood Loss: 5 ml   Complications: None     Findings:  No significant intra-abdominal adhesive disease. Left ovary with mild filmy adhesions to the pelvic side wall. Uterus, bilateral fallopian tubes and right ovary absent, consistent with patient's prior surgical history.    Indications:   Lacey Hubbard is a 37 y.o.  who presented for management of chronic pelvic pain. She was counseled that oophorectomy may or may not improve her pain and would cause iatrogenic menopause. Due to history of endometriosis, she requested to proceed and voiced understanding of the risks and benefits of the procedure.    Procedure:   After the appropriate time out and after adequate dosing of anesthesia, the patient was prepped and draped in the usual sterile fashion. She was placed in the dorsal lithotomy position using Harshal stirrups. A johnson catheter had been placed per nursing for drainage during the procedure. A 5 mm umbilical skin incision was made with the knife. The abdominal wall was elevated and using the Visiport and laparoscope, the abdomen was entered under direct visualization. The abdomen was insufflated with carbon dioxide to a  pressure of 15 mmHg. The patient was placed in Trendelenburg position. Two ancillary trocars, a 5 mm trocar on the left and a 10 mm trocar on the right, were placed in the left and right lower quadrants, lateral to the epigastric vessels, under direct visualization without any complications. The pelvis was explored with the above findings noted.     Using the Harmonic scalpel and an atraumatic grasper, the left ovary was lifted away from the pelvic sidewall and the infundibulopelvic ligament was cauterized and transected. Thin adhesions between the ovary and sidewall were also cauterized and transected, freeing the ovary. The specimen was placed in an Endopouch and retrieved through the right lateral port. The pelvis was suctioned. Inspection revealed adequate hemostasis at the surgical site. The abdomen was released of carbon dioxide and the site of surgical excision was noted to remain hemostatic. The ancillary trocar sleeves were then removed and noted to be hemostatic. After full desufflation, the umbilical port was also removed. The skin was closed at all sites using 3-0 Monocryl in a subcuticular fashion. Surgical glue was placed. All instrument and sponge counts were correct at the end of the procedure. The patient tolerated the procedure well and was taken to the postoperative recovery room in stable condition.     Kylie Alexander MD   Obstetrics and Gynecology  [facility]

## 2023-03-16 NOTE — ANESTHESIA POSTPROCEDURE EVALUATION
Patient: Lacey Hubbard    Procedure Summary     Date: 03/16/23 Room / Location: [facility] OR  /  [facility] OR    Anesthesia Start: 1018 Anesthesia Stop: 1129    Procedure: DIAGNOSTIC LAPAROSCOPY WITH LEFT OOPHORECTOMY. (Left: Abdomen) Diagnosis:       Chronic pelvic pain in female      Female dyspareunia      History of endometriosis      (Chronic pelvic pain in female [R10.2, G89.29])      (Female dyspareunia [N94.10])      (History of endometriosis [Z87.42])    Surgeons: Kylie Alexander MD Provider: Rosa Garza CRNA    Anesthesia Type: general ASA Status: 2          Anesthesia Type: general    Vitals  Vitals Value Taken Time   BP 90/58 03/16/23 1235   Temp 97.1 °F (36.2 °C) 03/16/23 1130   Pulse 58 03/16/23 1236   Resp 11 03/16/23 1215   SpO2 94 % 03/16/23 1236   Vitals shown include unvalidated device data.        Post Anesthesia Care and Evaluation    Patient location during evaluation: PACU  Patient participation: complete - patient participated  Level of consciousness: awake and alert  Pain score: 2  Pain management: satisfactory to patient    Airway patency: patent  Anesthetic complications: No anesthetic complications  PONV Status: none  Cardiovascular status: acceptable and stable  Respiratory status: acceptable  Hydration status: acceptable    Comments: Vitals signs as noted in nursing documentation as per protocol.

## 2023-03-16 NOTE — DISCHARGE INSTRUCTIONS
To assist you in voiding:  Drink plenty of fluids  Listen to running water while attempting to void.    If you are unable to urinate and you have an uncomfortable urge to void or it has been   6 hours since you were discharged, return to the Emergency Room Please follow all post op instructions and follow up appointment time from your physician's office included in your discharge packet.  .  Rest today  No pushing,pulling,tugging,heavy lifting, or strenuous activity   No major decision making,driving,or drinking alcoholic beverages for 24 hours due to the sedation you received  Always use good hand hygiene/washing technique  No driving on pain medication. Pelvic rest is best described as not putting anything in your vagina. This includes tampons, douching, tub bathing or sexual activity.

## 2023-03-20 LAB — REF LAB TEST METHOD: NORMAL

## 2023-03-22 NOTE — PROGRESS NOTES
"Postoperative Assessment Visit    Subjective   Chief Complaint   Patient presents with   • Post-op     Patient states she is still having some soreness on the right side still.      Lacey Hubbard is a 37 y.o.  female who presents for a post-op visit. She is s/p diagnostic laparoscopy and left oophorectomy on 3/16/23 for management of chronic pelvic pain in the setting of a prior hysterectomy and RSO. Her procedure was uncomplicated and she was discharged the same day.    Reports her pain has been well controlled other than some residual soreness on the right and some mild redness surrounding her incision on that side. She is tolerating PO, voiding spontaneously. Initially had some constipation but that has improved.     Objective   Vitals:    23 0955   BP: 94/52   Weight: 50.8 kg (112 lb)   Height: 157.5 cm (62\")     Physical Exam:  Gen: well appearing, NAD  Abd: soft, non-distended, non-tender  Incision(s): c/d/i, healing well, mild edema surrounding RLQ incision but no fluctuance or drainage    Pathology:  DIAGNOSIS:   OVARY, LEFT:   Hemorrhagic corpus luteal cyst   Negative for neoplasia/malignancy        Medical Decision Making:  I have reviewed the operative note. I have reviewed the postoperative labs where appropriate.     Assessment & Plan       Diagnosis Plan   1. Postoperative follow-up        2. Chronic pelvic pain in female           Medication Management: None    Patient is meeting post-op milestones.  Surgical findings and pathology reviewed.    RTC 4 wks for discussion of HRT.    Kylie Alexander MD   Obstetrics and Gynecology  [facility]   "

## 2023-03-23 ENCOUNTER — OFFICE VISIT (OUTPATIENT)
Dept: OBSTETRICS AND GYNECOLOGY | Facility: CLINIC | Age: 38
End: 2023-03-23
Payer: COMMERCIAL

## 2023-03-23 VITALS
DIASTOLIC BLOOD PRESSURE: 52 MMHG | HEIGHT: 62 IN | WEIGHT: 112 LBS | BODY MASS INDEX: 20.61 KG/M2 | SYSTOLIC BLOOD PRESSURE: 94 MMHG

## 2023-03-23 DIAGNOSIS — Z09 POSTOPERATIVE FOLLOW-UP: Primary | ICD-10-CM

## 2023-03-23 DIAGNOSIS — R10.2 CHRONIC PELVIC PAIN IN FEMALE: ICD-10-CM

## 2023-03-23 DIAGNOSIS — G89.29 CHRONIC PELVIC PAIN IN FEMALE: ICD-10-CM

## 2023-06-18 ENCOUNTER — APPOINTMENT (OUTPATIENT)
Dept: GENERAL RADIOLOGY | Facility: HOSPITAL | Age: 38
End: 2023-06-18
Payer: COMMERCIAL

## 2023-06-18 ENCOUNTER — HOSPITAL ENCOUNTER (EMERGENCY)
Facility: HOSPITAL | Age: 38
Discharge: HOME | End: 2023-06-18
Attending: STUDENT IN AN ORGANIZED HEALTH CARE EDUCATION/TRAINING PROGRAM | Admitting: STUDENT IN AN ORGANIZED HEALTH CARE EDUCATION/TRAINING PROGRAM
Payer: COMMERCIAL

## 2023-06-18 VITALS
OXYGEN SATURATION: 98 % | TEMPERATURE: 97.2 F | SYSTOLIC BLOOD PRESSURE: 124 MMHG | HEART RATE: 76 BPM | RESPIRATION RATE: 18 BRPM | BODY MASS INDEX: 20.61 KG/M2 | DIASTOLIC BLOOD PRESSURE: 83 MMHG | HEIGHT: 62 IN | WEIGHT: 112 LBS

## 2023-06-18 DIAGNOSIS — S69.92XA INJURY OF LEFT RING FINGER, INITIAL ENCOUNTER: Primary | ICD-10-CM

## 2023-06-18 PROCEDURE — 99283 EMERGENCY DEPT VISIT LOW MDM: CPT

## 2023-06-18 PROCEDURE — 73130 X-RAY EXAM OF HAND: CPT

## 2023-06-18 NOTE — ED PROVIDER NOTES
Subjective  History of Present Illness:    Chief Complaint:   Chief Complaint   Patient presents with    Hand Injury      History of Present Illness: Lacey Hubbard is a 37 y.o. female who presents to the emergency department complaining of left hand injury.  Patient states yesterday tripped and fell through a floor that they were replacing her left hand between floor door since she injured her fourth finger and the area of the fourth metacarpal over the dorsum of her left hand.  She is right dominant.  Decreased range of motion of the left fourth ring finger secondary to pain.  There is a small well approximated nonbleeding laceration to the palmar aspect of the PIP joint of the left fourth finger.  Onset: Yesterday  Duration: Single inciting injury with ongoing pain  Exacerbating / Alleviating factors: With any attempted range of motion of the left fourth finger about the proximal interphalangeal joint  Associated symptoms: None      Nurses Notes reviewed and agree, including vitals, allergies, social history and prior medical history.     Review of Systems   Constitutional: Negative.    HENT: Negative.     Eyes: Negative.    Respiratory: Negative.     Cardiovascular: Negative.    Gastrointestinal: Negative.    Genitourinary: Negative.    Musculoskeletal:         Left fourth finger fracture   Skin: Negative.    Neurological: Negative.    Psychiatric/Behavioral: Negative.       Past Medical History:   Diagnosis Date    Abdominal pain     Anxiety     Asthma     AS A KID    Cyst of ovary     REMOVED    Lower back pain     Ovarian cyst     Stress incontinence     with coughing, laughing       Allergies:    Penicillins and Naproxen      Past Surgical History:   Procedure Laterality Date    DIAGNOSTIC LAPAROSCOPY Left 3/16/2023    Procedure: DIAGNOSTIC LAPAROSCOPY WITH LEFT OOPHORECTOMY.;  Surgeon: Kylie Alexander MD;  Location: [facility];  Service: Obstetrics/Gynecology;  Laterality: Left;    DIAGNOSTIC  "LAPAROSCOPY, SALPINGO OOPHORECTOMY LAPAROSCOPIC Right 09/01/2015    HYSTERECTOMY  05/02/2019    reported by pt     OTHER SURGICAL HISTORY Left     Broken Hand    TUBAL ABDOMINAL LIGATION      WISDOM TOOTH EXTRACTION           Social History     Socioeconomic History    Marital status:    Tobacco Use    Smoking status: Every Day     Packs/day: 0.50     Years: 15.00     Pack years: 7.50     Types: Cigarettes    Smokeless tobacco: Never    Tobacco comments:     Cut back from 1.5 ppd to 0.5 ppd   Vaping Use    Vaping Use: Never used   Substance and Sexual Activity    Alcohol use: Not Currently    Drug use: Yes     Types: Marijuana     Comment: rarely    Sexual activity: Defer     Birth control/protection: Hysterectomy         Family History   Problem Relation Age of Onset    Breast cancer Sister        Objective  Physical Exam:  /83 (BP Location: Left arm, Patient Position: Sitting)   Pulse 76   Temp 97.2 °F (36.2 °C) (Oral)   Resp 18   Ht 157.5 cm (62\")   Wt 50.8 kg (112 lb)   LMP 03/21/2019 (Approximate)   SpO2 98%   BMI 20.49 kg/m²      Physical Exam  Vitals and nursing note reviewed.   Constitutional:       Appearance: Normal appearance.   HENT:      Head: Normocephalic and atraumatic.      Nose: Nose normal.   Eyes:      Extraocular Movements: Extraocular movements intact.   Cardiovascular:      Rate and Rhythm: Normal rate.   Pulmonary:      Effort: Pulmonary effort is normal.   Abdominal:      General: Abdomen is flat.   Musculoskeletal:        Hands:       Cervical back: Normal range of motion.      Comments: Soft tissue swelling about the PIP joint of the left fourth finger.  There is a very small superficial laceration to the palmar aspect overlying the PIP joint the left fourth finger without any cellulitis or purulent drainage.  Decreased range of motion secondary to pain.  No obvious deformity.  Brisk capillary refill distal   Skin:     General: Skin is warm and dry.   Neurological:     "  General: No focal deficit present.      Mental Status: She is alert. Mental status is at baseline.   Psychiatric:         Mood and Affect: Mood normal.         Behavior: Behavior normal.         Procedures  Aluminum finger splint applied in position of comfort to the left fourth finger secured in place loosely with Kerlix and then a 2 inch Ace wrap.  Neurovascular intact after application.  Patient tolerated procedure well.  ED Course:         Lab Results (last 24 hours)       ** No results found for the last 24 hours. **             XR Hand 3+ View Left    Result Date: 6/18/2023  PROCEDURE: XR HAND 3+ VW LEFT-  HISTORY: fall, injury to fourth finger and fourth metacarpal  COMPARISON: None.  FINDINGS: AP, oblique and lateral views of the left hand were obtained. There is no fracture, dislocation or other abnormality of bone or joint.  Joint spaces are preserved.   There is soft tissue edema of the fourth finger.      Impression: Nonspecific soft tissue edema of the fourth finger without acute osseous abnormality.      This report was signed and finalized on 6/18/2023 11:34 AM by Rosa Elena Gunter MD.        Medical Decision Making  Amount and/or Complexity of Data Reviewed  Radiology: ordered.        Lacey Hubbard is a 37 y.o. female who presents to the emergency department for evaluation of left fourth finger injury    Differential diagnosis includes fracture, sprain, dislocation among other etiologies.    Plain films of the left hand ordered for further evaluation of the patient's presentation.    Chart review if available included outside testing, previous visits, prior labs, prior imaging, available notes from prior evaluations or visits with specialists, medication list, allergies, past medical history, past surgical history when applicable.    Patient was treated with finger splint     Plan for disposition is discharged home.  Patient/family comfortable with and understanding of the plan.      Final  diagnoses:   Injury of left ring finger, initial encounter          Erick Hines PA-C  06/18/23 [id number]

## 2023-10-05 ENCOUNTER — APPOINTMENT (OUTPATIENT)
Dept: ULTRASOUND IMAGING | Facility: HOSPITAL | Age: 38
End: 2023-10-05
Payer: COMMERCIAL

## 2025-03-03 ENCOUNTER — HOSPITAL ENCOUNTER (EMERGENCY)
Facility: HOSPITAL | Age: 40
Discharge: HOME | End: 2025-03-03
Attending: STUDENT IN AN ORGANIZED HEALTH CARE EDUCATION/TRAINING PROGRAM

## 2025-03-03 VITALS
TEMPERATURE: 98.1 F | RESPIRATION RATE: 18 BRPM | HEART RATE: 85 BPM | WEIGHT: 111 LBS | BODY MASS INDEX: 20.43 KG/M2 | HEIGHT: 62 IN | DIASTOLIC BLOOD PRESSURE: 94 MMHG | SYSTOLIC BLOOD PRESSURE: 124 MMHG | OXYGEN SATURATION: 95 %

## 2025-03-03 DIAGNOSIS — R05.1 ACUTE COUGH: ICD-10-CM

## 2025-03-03 DIAGNOSIS — R11.0 NAUSEA: ICD-10-CM

## 2025-03-03 DIAGNOSIS — B34.9 VIRAL SYNDROME: Primary | ICD-10-CM

## 2025-03-03 LAB
FLUAV RNA RESP QL NAA+PROBE: NOT DETECTED
FLUBV RNA RESP QL NAA+PROBE: NOT DETECTED
SARS-COV-2 RNA RESP QL NAA+PROBE: NOT DETECTED

## 2025-03-03 PROCEDURE — 87636 SARSCOV2 & INF A&B AMP PRB: CPT

## 2025-03-03 PROCEDURE — 63710000001 ONDANSETRON ODT 4 MG TABLET DISPERSIBLE

## 2025-03-03 PROCEDURE — 99283 EMERGENCY DEPT VISIT LOW MDM: CPT | Performed by: STUDENT IN AN ORGANIZED HEALTH CARE EDUCATION/TRAINING PROGRAM

## 2025-03-03 RX ORDER — FAMOTIDINE 20 MG/1
20 TABLET, FILM COATED ORAL 2 TIMES DAILY
Qty: 14 TABLET | Refills: 0 | Status: SHIPPED | OUTPATIENT
Start: 2025-03-03 | End: 2025-03-10

## 2025-03-03 RX ORDER — ONDANSETRON 4 MG/1
4 TABLET, ORALLY DISINTEGRATING ORAL EVERY 8 HOURS PRN
Qty: 21 TABLET | Refills: 0 | Status: SHIPPED | OUTPATIENT
Start: 2025-03-03 | End: 2025-03-10

## 2025-03-03 RX ORDER — DICYCLOMINE HCL 20 MG
20 TABLET ORAL EVERY 6 HOURS PRN
Qty: 28 TABLET | Refills: 0 | Status: SHIPPED | OUTPATIENT
Start: 2025-03-03 | End: 2025-03-10

## 2025-03-03 RX ORDER — BROMPHENIRAMINE MALEATE, PSEUDOEPHEDRINE HYDROCHLORIDE, AND DEXTROMETHORPHAN HYDROBROMIDE 2; 30; 10 MG/5ML; MG/5ML; MG/5ML
5 SYRUP ORAL 3 TIMES DAILY PRN
Qty: 60 ML | Refills: 0 | Status: SHIPPED | OUTPATIENT
Start: 2025-03-03 | End: 2025-03-07

## 2025-03-03 RX ORDER — DICYCLOMINE HYDROCHLORIDE 10 MG/1
20 CAPSULE ORAL ONCE
Status: COMPLETED | OUTPATIENT
Start: 2025-03-03 | End: 2025-03-03

## 2025-03-03 RX ORDER — ONDANSETRON 4 MG/1
4 TABLET, ORALLY DISINTEGRATING ORAL ONCE
Status: COMPLETED | OUTPATIENT
Start: 2025-03-03 | End: 2025-03-03

## 2025-03-03 RX ORDER — FLUTICASONE PROPIONATE 50 MCG
2 SPRAY, SUSPENSION (ML) NASAL DAILY
Qty: 16 G | Refills: 0 | Status: SHIPPED | OUTPATIENT
Start: 2025-03-03 | End: 2025-03-17

## 2025-03-03 RX ORDER — CODEINE PHOSPHATE AND GUAIFENESIN 10; 100 MG/5ML; MG/5ML
10 SOLUTION ORAL ONCE
Status: COMPLETED | OUTPATIENT
Start: 2025-03-03 | End: 2025-03-03

## 2025-03-03 RX ADMIN — GUAIFENESIN AND CODEINE PHOSPHATE 10 ML: 100; 10 SOLUTION ORAL at 21:07

## 2025-03-03 RX ADMIN — DICYCLOMINE HYDROCHLORIDE 20 MG: 10 CAPSULE ORAL at 21:07

## 2025-03-03 RX ADMIN — ONDANSETRON 4 MG: 4 TABLET, ORALLY DISINTEGRATING ORAL at 21:07

## 2025-03-04 NOTE — ED PROVIDER NOTES
EMERGENCY DEPARTMENT ENCOUNTER    Pt Name: Lacey Hubbard  MRN: [id number]  Pt :   1985  Room Number:  02SF02  Date of encounter:  3/3/2025  PCP: Provider, No Known  ED Provider: Ron Saenz PA-C    Historian: Patient,  at bedside, nursing note      HPI:  Chief Complaint: Cough, nausea        Context: Lacey Hubbard is a 39 y.o. female who presents to the ED c/o cough for the past week.  Patient states since last Wednesday she has had intermittent cough, several episodes of diarrhea, nausea, and congestion symptoms.  Patient also states she is lost her sense of smell and taste.  Patient denies any chest pain or shortness of breath or abdominal pain at this time.      PAST MEDICAL HISTORY  Past Medical History:   Diagnosis Date    Abdominal pain     Anxiety     Asthma     AS A KID    Cyst of ovary     REMOVED    Lower back pain     Ovarian cyst     Stress incontinence     with coughing, laughing         PAST SURGICAL HISTORY  Past Surgical History:   Procedure Laterality Date    DIAGNOSTIC LAPAROSCOPY Left 3/16/2023    Procedure: DIAGNOSTIC LAPAROSCOPY WITH LEFT OOPHORECTOMY.;  Surgeon: Kylie Alexander MD;  Location: [facility];  Service: Obstetrics/Gynecology;  Laterality: Left;    DIAGNOSTIC LAPAROSCOPY, SALPINGO OOPHORECTOMY LAPAROSCOPIC Right 2015    HYSTERECTOMY  2019    reported by pt     OTHER SURGICAL HISTORY Left     Broken Hand    TUBAL ABDOMINAL LIGATION      WISDOM TOOTH EXTRACTION           FAMILY HISTORY  Family History   Problem Relation Age of Onset    Breast cancer Sister          SOCIAL HISTORY  Social History     Socioeconomic History    Marital status:    Tobacco Use    Smoking status: Every Day     Current packs/day: 0.50     Average packs/day: 0.5 packs/day for 15.0 years (7.5 ttl pk-yrs)     Types: Cigarettes    Smokeless tobacco: Never    Tobacco comments:     Cut back from 1.5 ppd to 0.5 ppd   Vaping Use    Vaping status: Never Used    Substance and Sexual Activity    Alcohol use: Not Currently    Drug use: Yes     Types: Marijuana     Comment: rarely    Sexual activity: Defer     Birth control/protection: Hysterectomy         ALLERGIES  Penicillins and Naproxen        REVIEW OF SYSTEMS  Review of Systems   Constitutional:  Negative for chills and fever.   HENT:  Positive for congestion. Negative for sore throat.    Respiratory:  Positive for cough. Negative for shortness of breath.    Cardiovascular:  Negative for chest pain.   Gastrointestinal:  Positive for nausea. Negative for abdominal pain and vomiting.   Genitourinary:  Negative for dysuria.   Musculoskeletal:  Negative for back pain.   Skin:  Negative for wound.   Neurological:  Negative for dizziness and headaches.   Psychiatric/Behavioral:  Negative for confusion.    All other systems reviewed and are negative.         All systems reviewed and negative except for those discussed in HPI.       PHYSICAL EXAM    I have reviewed the triage vital signs and nursing notes.    ED Triage Vitals [03/03/25 2010]   Temp Heart Rate Resp BP SpO2   98.1 °F (36.7 °C) 85 18 129/95 95 %      Temp src Heart Rate Source Patient Position BP Location FiO2 (%)   Oral Monitor Sitting Left arm --       Physical Exam  Vitals and nursing note reviewed.   Constitutional:       General: She is not in acute distress.     Appearance: She is not ill-appearing, toxic-appearing or diaphoretic.   HENT:      Head: Normocephalic and atraumatic.      Mouth/Throat:      Mouth: Mucous membranes are moist.      Pharynx: Oropharynx is clear.   Eyes:      Extraocular Movements: Extraocular movements intact.   Cardiovascular:      Rate and Rhythm: Normal rate.      Heart sounds: Normal heart sounds.   Pulmonary:      Effort: Pulmonary effort is normal. No respiratory distress.      Breath sounds: Normal breath sounds. No stridor. No wheezing or rales.   Abdominal:      Tenderness: There is no abdominal tenderness. There is no  right CVA tenderness, left CVA tenderness, guarding or rebound.   Skin:     General: Skin is warm and dry.      Findings: No rash.   Neurological:      Mental Status: She is alert.             LAB RESULTS  Recent Results (from the past 24 hours)   COVID-19 and FLU A/B PCR, 1 HR TAT - Swab, Nasopharynx    Collection Time: 03/03/25  8:15 PM    Specimen: Nasopharynx; Swab   Result Value Ref Range    COVID19 Not Detected Not Detected - Ref. Range    Influenza A PCR Not Detected Not Detected    Influenza B PCR Not Detected Not Detected       If labs were ordered, I independently reviewed the results and considered them in treating the patient.        RADIOLOGY  No Radiology Exams Resulted Within Past 24 Hours    I ordered and independently reviewed the above noted radiographic studies.      See radiologist's dictation for official interpretation.        PROCEDURES    Procedures    No orders to display       MEDICATIONS GIVEN IN ER    Medications   ondansetron ODT (ZOFRAN-ODT) disintegrating tablet 4 mg (has no administration in time range)   dicyclomine (BENTYL) capsule 20 mg (has no administration in time range)   guaiFENesin-codeine (ROMILAR-AC) solution 10 mL (has no administration in time range)         MEDICAL DECISION MAKING, PROGRESS, and CONSULTS    All labs, if obtained, have been independently reviewed by me.  All radiology studies, if obtained, have been reviewed by me and the radiologist dictating the report.  All EKG's, if obtained, have been independently viewed and interpreted by me/my attending physician.      Discussion below represents my analysis of pertinent findings related to patient's condition, differential diagnosis, treatment plan and final disposition.    39-year-old female presenting to ER today for evaluation of several day history of cough congestion in the setting of recent flu exposure.  Today the patient is mildly repelling but nontoxic in appearance upright alert oriented with no  conversational dyspnea.  Vital signs and pulse oximetry are all stable and within normal limits per my independent interpretation.  Mild nasal congestion noted lungs are clear to auscultation bilaterally and no other acute findings on exam.  Initial plan for COVID and flu testing.  Patient given cough medication Zofran and Bentyl in the ED.    COVID and flu testing is negative.  I reevaluated the patient she remains resting comfortably and hemodynamically stable per my review her vital signs.  I discussed these results did offer further testing including labs and imaging, but patient declined stating she suspect viral syndrome as cause of symptoms and request discharge with prescriptions for symptomatic management of which I am in agreement with this plan.  Encouraged PCP follow-up and strict ED return precautions of which the patient verbalized understanding.                       Differential diagnosis:    Differential diagnosis included but was not limited to flu, COVID, viral syndrome, allergic rhinitis, sinusitis      Additional sources:    - Discussed/ obtained information from independent historians:  at bedside    - External (non-ED) record review: Previous medical records reviewed    - Chronic or social conditions impacting care: None    Orders placed during this visit:  Orders Placed This Encounter   Procedures    COVID-19 and FLU A/B PCR, 1 HR TAT - Swab, Nasopharynx         Additional orders considered but not ordered: Chest x-ray.   I did consider chest x-ray however patient's lungs were CTAB bilaterally, and oxygenating at 95% on room air, she is afebrile, I have no clinical suspicion for pneumonia therefore did not feel chest x-ray warranted.      ED Course:    Consultants: None                Shared Decision Making:  After my consideration of clinical presentation and any laboratory/radiology studies obtained, I discussed the findings with the patient/patient representative who is in agreement  with the treatment plan and the final disposition.   Risks and benefits of discharge and/or observation/admission were discussed.       AS OF 21:03 EST VITALS:    BP - 124/94  HR - 85  TEMP - 98.1 °F (36.7 °C) (Oral)  O2 SATS - 95%                  DIAGNOSIS  Final diagnoses:   Acute cough   Nausea   Viral syndrome         DISPOSITION  Discharge      Please note that portions of this document were completed with voice recognition software.      Ron Saenz PA-C  03/06/25 0116

## 2025-03-04 NOTE — DISCHARGE INSTRUCTIONS
Return to the ER for any acute change or worsening of your condition we will trial some nausea medication dicyclomine for abdominal cramps cough medication and nasal decongestant spray.  However if symptoms worsen in any way or you develop internal abdominal or chest pains we highly recommend you return to the ER immediately for reevaluation.

## (undated) DEVICE — GLV SURG SENSICARE POLYISPRN W/ALOE PF LF 6.5 GRN STRL

## (undated) DEVICE — SOL SCRUB STAT ENDURE 420 CIDASTAT 2PCT FM 4OZ

## (undated) DEVICE — NDL HYPO ECLPS SFTY 25G 1 1/2IN

## (undated) DEVICE — GLV SURG ULTRATOUCH BIOGEL/COAT PF LF SZ6 STRL

## (undated) DEVICE — SUT MNCRYL PLS ANTIB UD 3/0 PS2 27IN

## (undated) DEVICE — SLV SCD CALF HEMOFORCE DVT THERP REPROC MD

## (undated) DEVICE — RICH LAVH: Brand: MEDLINE INDUSTRIES, INC.

## (undated) DEVICE — ENDOPATH XCEL BLADELESS TROCARS WITH STABILITY SLEEVES: Brand: ENDOPATH XCEL

## (undated) DEVICE — ADHS SKIN PREMIERPRO EXOFIN TOPICAL HI/VISC .5ML

## (undated) DEVICE — DRSNG WND GZ PAD BORDERED LF 4X5IN STRL

## (undated) DEVICE — ANTIBACTERIAL UNDYED BRAIDED (POLYGLACTIN 910), SYNTHETIC ABSORBABLE SUTURE: Brand: COATED VICRYL

## (undated) DEVICE — PAD TRENDELENBURG W/RAIL STRAP 44X23X1IN

## (undated) DEVICE — Device: Brand: TISSUE RETRIEVAL SYSTEM

## (undated) DEVICE — DISPOSABLE MONOPOLAR ENDOSCOPIC CORD 10 FT. (3M): Brand: KIRWAN

## (undated) DEVICE — SYR LL TP 10ML STRL

## (undated) DEVICE — SOL IRR NACL 0.9PCT BT 1000ML

## (undated) DEVICE — TP ELECTRD LAP L WR SPLIT33CM

## (undated) DEVICE — ENDOPATH XCEL UNIVERSAL TROCAR STABLILITY SLEEVES: Brand: ENDOPATH XCEL

## (undated) DEVICE — HARMONIC 1100 SHEARS, 36CM SHAFT LENGTH: Brand: HARMONIC